# Patient Record
Sex: FEMALE | Race: WHITE | HISPANIC OR LATINO | Employment: UNEMPLOYED | URBAN - METROPOLITAN AREA
[De-identification: names, ages, dates, MRNs, and addresses within clinical notes are randomized per-mention and may not be internally consistent; named-entity substitution may affect disease eponyms.]

---

## 2017-10-10 ENCOUNTER — ALLSCRIPTS OFFICE VISIT (OUTPATIENT)
Dept: OTHER | Facility: OTHER | Age: 32
End: 2017-10-10

## 2017-10-10 DIAGNOSIS — F32.9 MAJOR DEPRESSIVE DISORDER, SINGLE EPISODE: ICD-10-CM

## 2017-10-10 DIAGNOSIS — D50.9 IRON DEFICIENCY ANEMIA: ICD-10-CM

## 2017-10-10 DIAGNOSIS — Z13.6 ENCOUNTER FOR SCREENING FOR CARDIOVASCULAR DISORDERS: ICD-10-CM

## 2017-10-11 NOTE — PROGRESS NOTES
Assessment  1  Depression (311) (F32 9)  2  Seborrheic dermatitis (690 10) (L21 9)  3  Itching (698 9) (L29 9)  4  Anemia, iron deficiency (280 9) (D50 9)  5  Obesity (278 00) (E66 9)  6  Screening for cardiovascular condition (V81 2) (Z13 6)    Plan  Anemia, iron deficiency    · (1) CBC/PLT/DIFF; Status:Active; Requested for:10Oct2017;    · (1) COMPREHENSIVE METABOLIC PANEL; Status:Active; Requested for:10Oct2017;   Depression    · Start: BuPROPion HCl ER (XL) 150 MG Oral Tablet Extended Release 24 Hour; take 1  tablet by mouth daily   · (1) TSH; Status:Active; Requested for:10Oct2017;   Obesity    · Keep a diary of when and what you eat ; Status:Complete;   Done: 65KYB0296   · Some eating tips that can help you lose weight ; Status:Complete;   Done: 72BBI0091   · We recommend that you bring your body mass index down to 26 ; Status:Complete;    Done: 19ZDX7088  Screening for cardiovascular condition    · (1) LIPID PANEL, FASTING; Status:Active; Requested for:10Oct2017;   Seborrheic dermatitis    · Start: Ketoconazole 2 % External Shampoo; shampoo twice weekly    Discussion/Summary  Discussion Summary:   Depressionpatient is going through a rough time with her marital life  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 as her  is out at work from 5 AM to 10 PM  She was previously taking bupropion  She was restarted on the same  I will see her back in 2 months  dermatitisshe was given ketoconazole shampoo to be applied twice a week  was told to cut down on the calories and increase her activity  work was ordered  Counseling Documentation With Imm: The patient was counseled regarding instructions for management,-risk factor reductions,-risks and benefits of treatment options,-importance of compliance with treatment  Medication SE Review and Pt Understands Tx: Possible side effects of new medications were reviewed with the patient/guardian today   The treatment plan was reviewed with the patient/guardian  The patient/guardian understands and agrees with the treatment plan      Chief Complaint  Chief Complaint Free Text Note Form: Establish care      History of Present Illness  Obesity (Follow-Up): The patient is being seen for follow-up of obesity  The patient reports no change in the condition  She has had no significant interval events  The patient is currently asymptomatic  The patient is not currently on medication for this problem  Iron Deficiency Anemia (Follow-Up): The patient is being seen for follow-up of iron deficiency anemia  The patient reports no change in the condition  She has had no significant interval events  The patient is currently asymptomatic  Medications:  the patient is adherent to her medication regimen, but-she denies medication side effects  Depression (Follow-Up): The patient states her depression has worsened since the last visit  They have had recurrent episodes of major depression  She describes this as moderate in severity  She has no comorbid illnesses  She has had no significant interval events  Interval Symptoms: worsened depression-and-worsened depressed mood  Associated symptoms include:  No associated symptoms are reported  Social Support: the patient does not have good social support  Medications: The patient is not currently on any medications for her depression  Review of Systems  Complete-Female:   Constitutional: No fever, no chills, feels well, no tiredness, no recent weight gain or weight loss  Eyes: No complaints of eye pain, no red eyes, no eyesight problems, no discharge, no dry eyes, no itching of eyes  ENT: no complaints of earache, no loss of hearing, no nose bleeds, no nasal discharge, no sore throat, no hoarseness  Cardiovascular: No complaints of slow heart rate, no fast heart rate, no chest pain, no palpitations, no leg claudication, no lower extremity edema     Respiratory: No complaints of shortness of breath, no wheezing, no cough, no SOB on exertion, no orthopnea, no PND  Gastrointestinal: No complaints of abdominal pain, no constipation, no nausea or vomiting, no diarrhea, no bloody stools  Genitourinary: No complaints of dysuria, no incontinence, no pelvic pain, no dysmenorrhea, no vaginal discharge or bleeding  Musculoskeletal: No complaints of arthralgias, no myalgias, no joint swelling or stiffness, no limb pain or swelling  Integumentary: No complaints of skin rash or lesions, no itching, no skin wounds, no breast pain or lump  Neurological: No complaints of headache, no confusion, no convulsions, no numbness, no dizziness or fainting, no tingling, no limb weakness, no difficulty walking  Psychiatric: as noted in HPI  Endocrine: No complaints of proptosis, no hot flashes, no muscle weakness, no deepening of the voice, no feelings of weakness  Hematologic/Lymphatic: No complaints of swollen glands, no swollen glands in the neck, does not bleed easily, does not bruise easily  Active Problems  1  Seasonal allergies (477 9) (J30 2)    Past Medical History  1  History of Denial (069 29) (R48 89)  Active Problems And Past Medical History Reviewed: The active problems and past medical history were reviewed and updated today  Surgical History  1  Denied: History of Recent Surgery  Surgical History Reviewed: The surgical history was reviewed and updated today  Family History  Mother   1  No pertinent family history  Father   2  No pertinent family history  Family History Reviewed: The family history was reviewed and updated today  Social History   · Never a smoker  Social History Reviewed: The social history was reviewed and updated today  The social history was reviewed and is unchanged  Current Meds  1  Aleve 220 MG Oral Capsule; Therapy: 14CFU1928 to Recorded  2  Allegra Allergy 60 MG Oral Tablet; Therapy: 02FEW7060 to Recorded  3   Fish Oil 1000 MG Oral Capsule; Therapy: 72MSQ9980 to Recorded  4  Melatonin 5 MG Oral Capsule; Therapy: 76OGP2301 to Recorded  5  Prenatal Vitamin 27-0 8 MG Oral Tablet; Therapy: 22DLU5668 to Recorded    Allergies  1  Sulfa Drugs    Vitals  Signs   Recorded: 01EOP9768 01:10PM   Heart Rate: 76  Systolic: 830  Diastolic: 70  Height: 5 ft 1 in  Weight: 177 lb 2 oz  BMI Calculated: 33 47  BSA Calculated: 1 79    Physical Exam    Constitutional   General appearance: Abnormal   obese  Head and Face   Head and face: Normal     Palpation of the face and sinuses: No sinus tenderness  Eyes   Conjunctiva and lids: No swelling, erythema or discharge  Pupils and irises: Equal, round, reactive to light  Ears, Nose, Mouth, and Throat   External inspection of ears and nose: Normal     Otoscopic examination: Tympanic membranes translucent with normal light reflex  Canals patent without erythema  Oropharynx: Normal with no erythema, edema, exudate or lesions  Neck   Neck: Supple, symmetric, trachea midline, no masses  Thyroid: Normal, no thyromegaly  Pulmonary   Respiratory effort: No increased work of breathing or signs of respiratory distress  Auscultation of lungs: Clear to auscultation  Cardiovascular   Palpation of heart: Normal PMI, no thrills  Auscultation of heart: Normal rate and rhythm, normal S1 and S2, no murmurs  Examination of extremities for edema and/or varicosities: Normal     Abdomen   Abdomen: Non-tender, no masses  Liver and spleen: No hepatomegaly or splenomegaly  Lymphatic   Palpation of lymph nodes in neck: No lymphadenopathy  Musculoskeletal   Gait and station: Normal     Skin   Skin and subcutaneous tissue: Normal without rashes or lesions  Neurologic   Cranial nerves: Cranial nerves II-XII intact  Cortical function: Normal mental status  Reflexes: 2+ and symmetric  Sensation: No sensory loss      Psychiatric   Judgment and insight: Normal     Mood and affect: Abnormal   Mood and Affect: depressed  Results/Data  Health Maintenance Flow Sheet 68RWP1659 01:07PM      Test Name Result Flag Reference   Pap 2014-PMC       PHQ-2 Adult Depression Screening 86RBG6320 01:05PM User, Urvashi     Test Name Result Flag Reference   PHQ-2 Adult Depression Score 2     Over the last two weeks, how often have you been bothered by any of the following problems? Little interest or pleasure in doing things: Several days - 1  Feeling down, depressed, or hopeless: Several days - 1   PHQ-2 Adult Depression Screening Negative         Future Appointments    Date/Time Provider Specialty Site   12/11/2017 01:00 PM SONIA Dutton   Internal [address] [place] [address]     Signatures   Electronically signed by : SONIA Falk ; Oct 10 2017  5:28PM EST                       (Author)    Electronically signed by : SONIA Falk ; Oct 10 2017  5:28PM EST                       (Author)

## 2017-10-12 ENCOUNTER — LAB CONVERSION - ENCOUNTER (OUTPATIENT)
Dept: OTHER | Facility: OTHER | Age: 32
End: 2017-10-12

## 2017-10-12 LAB
A/G RATIO (HISTORICAL): 1.6 (CALC) (ref 1–2.5)
ALBUMIN SERPL BCP-MCNC: 4.2 G/DL (ref 3.6–5.1)
ALP SERPL-CCNC: 86 U/L (ref 33–115)
ALT SERPL W P-5'-P-CCNC: 10 U/L (ref 6–29)
AST SERPL W P-5'-P-CCNC: 16 U/L (ref 10–30)
BASOPHILS # BLD AUTO: 0.5 %
BASOPHILS # BLD AUTO: 33 CELLS/UL (ref 0–200)
BILIRUB SERPL-MCNC: 0.4 MG/DL (ref 0.2–1.2)
BUN SERPL-MCNC: 14 MG/DL (ref 7–25)
BUN/CREA RATIO (HISTORICAL): NORMAL (CALC) (ref 6–22)
CALCIUM SERPL-MCNC: 9 MG/DL (ref 8.6–10.2)
CHLORIDE SERPL-SCNC: 104 MMOL/L (ref 98–110)
CHOLEST SERPL-MCNC: 139 MG/DL
CHOLEST/HDLC SERPL: 3.2 (CALC)
CO2 SERPL-SCNC: 23 MMOL/L (ref 20–31)
CREAT SERPL-MCNC: 0.79 MG/DL (ref 0.5–1.1)
DEPRECATED RDW RBC AUTO: 14.8 % (ref 11–15)
EGFR AFRICAN AMERICAN (HISTORICAL): 116 ML/MIN/1.73M2
EGFR-AMERICAN CALC (HISTORICAL): 100 ML/MIN/1.73M2
EOSINOPHIL # BLD AUTO: 130 CELLS/UL (ref 15–500)
EOSINOPHIL # BLD AUTO: 2 %
GAMMA GLOBULIN (HISTORICAL): 2.6 G/DL (CALC) (ref 1.9–3.7)
GLUCOSE (HISTORICAL): 83 MG/DL (ref 65–99)
HCT VFR BLD AUTO: 36.9 % (ref 35–45)
HDLC SERPL-MCNC: 43 MG/DL
HGB BLD-MCNC: 11.9 G/DL (ref 11.7–15.5)
LDL CHOLESTEROL (HISTORICAL): 80 MG/DL (CALC)
LYMPHOCYTES # BLD AUTO: 1619 CELLS/UL (ref 850–3900)
LYMPHOCYTES # BLD AUTO: 24.9 %
MCH RBC QN AUTO: 26.6 PG (ref 27–33)
MCHC RBC AUTO-ENTMCNC: 32.2 G/DL (ref 32–36)
MCV RBC AUTO: 82.4 FL (ref 80–100)
MONOCYTES # BLD AUTO: 319 CELLS/UL (ref 200–950)
MONOCYTES (HISTORICAL): 4.9 %
NEUTROPHILS # BLD AUTO: 4401 CELLS/UL (ref 1500–7800)
NEUTROPHILS # BLD AUTO: 67.7 %
NON-HDL-CHOL (CHOL-HDL) (HISTORICAL): 96 MG/DL (CALC)
PLATELET # BLD AUTO: 250 THOUSAND/UL (ref 140–400)
PMV BLD AUTO: 11.8 FL (ref 7.5–12.5)
POTASSIUM SERPL-SCNC: 3.8 MMOL/L (ref 3.5–5.3)
RBC # BLD AUTO: 4.48 MILLION/UL (ref 3.8–5.1)
SODIUM SERPL-SCNC: 137 MMOL/L (ref 135–146)
TOTAL PROTEIN (HISTORICAL): 6.8 G/DL (ref 6.1–8.1)
TRIGL SERPL-MCNC: 82 MG/DL
TSH SERPL DL<=0.05 MIU/L-ACNC: 3.31 MIU/L
WBC # BLD AUTO: 6.5 THOUSAND/UL (ref 3.8–10.8)

## 2017-12-11 ENCOUNTER — ALLSCRIPTS OFFICE VISIT (OUTPATIENT)
Dept: OTHER | Facility: OTHER | Age: 32
End: 2017-12-11

## 2017-12-12 NOTE — PROGRESS NOTES
Assessment    1  Depression (311) (F32 9)    Plan  Depression    · BuPROPion HCl ER (XL) 300 MG Oral Tablet Extended Release 24 Hour; Take 1 tablet daily    Discussion/Summary  Discussion Summary:   Depression-patient was started on bupropion and is feeling better but still has a lot of depression  I will increase the dose of bupropion to 300 milligrams daily  She was encouraged to find herself a job so that she can get out of the house which will help her in her depression symptoms  I will see her back in 4 months  Counseling Documentation With Imm: The patient was counseled regarding instructions for management,-- risk factor reductions,-- risks and benefits of treatment options,-- importance of compliance with treatment  Medication SE Review and Pt Understands Tx: Possible side effects of new medications were reviewed with the patient/guardian today  The treatment plan was reviewed with the patient/guardian  The patient/guardian understands and agrees with the treatment plan      Chief Complaint  Chief Complaint Free Text Note Form: Depression      History of Present Illness  Depression (Follow-Up): The patient states her depression has been stable since the last visit  They have had recurrent episodes of major depression  She describes this as moderate in severity  She has no comorbid illnesses  She has had no significant interval events  Interval Symptoms: stable depression-- and-- stable depressed mood  Medications: the patient is adherent with her medication regimen  -- She denies medication side effects  Review of Systems  Complete-Female:  Constitutional: No fever, no chills, feels well, no tiredness, no recent weight gain or weight loss  Eyes: No complaints of eye pain, no red eyes, no eyesight problems, no discharge, no dry eyes, no itching of eyes  ENT: no complaints of earache, no loss of hearing, no nose bleeds, no nasal discharge, no sore throat, no hoarseness    Cardiovascular: No complaints of slow heart rate, no fast heart rate, no chest pain, no palpitations, no leg claudication, no lower extremity edema  Respiratory: No complaints of shortness of breath, no wheezing, no cough, no SOB on exertion, no orthopnea, no PND  Gastrointestinal: No complaints of abdominal pain, no constipation, no nausea or vomiting, no diarrhea, no bloody stools  Genitourinary: No complaints of dysuria, no incontinence, no pelvic pain, no dysmenorrhea, no vaginal discharge or bleeding  Musculoskeletal: No complaints of arthralgias, no myalgias, no joint swelling or stiffness, no limb pain or swelling  Integumentary: No complaints of skin rash or lesions, no itching, no skin wounds, no breast pain or lump  Neurological: No complaints of headache, no confusion, no convulsions, no numbness, no dizziness or fainting, no tingling, no limb weakness, no difficulty walking  Psychiatric: as noted in HPI  Endocrine: No complaints of proptosis, no hot flashes, no muscle weakness, no deepening of the voice, no feelings of weakness  Hematologic/Lymphatic: No complaints of swollen glands, no swollen glands in the neck, does not bleed easily, does not bruise easily  Active Problems  1  Anemia, iron deficiency (280 9) (D50 9)   2  Depression (311) (F32 9)   3  Obesity (278 00) (E66 9)   4  Screening for cardiovascular condition (V81 2) (Z13 6)   5  Seasonal allergies (477 9) (J30 2)   6  Seborrheic dermatitis (690 10) (L21 9)    Past Medical History  1  History of Denial (799 29) (R45 89)   2  History of itching (V13 3) (H39 544)  Active Problems And Past Medical History Reviewed: The active problems and past medical history were reviewed and updated today  Surgical History  1  Denied: History of Recent Surgery    Family History  Mother    1  No pertinent family history  Father    2  No pertinent family history    Social History     · Never a smoker  Social History Reviewed:  The social history was reviewed and updated today  The social history was reviewed and is unchanged  Current Meds   1  Aleve 220 MG Oral Capsule; Therapy: 72RPT5550 to Recorded   2  Allegra Allergy 60 MG Oral Tablet; Therapy: 87VAL5247 to Recorded   3  BuPROPion HCl ER (XL) 150 MG Oral Tablet Extended Release 24 Hour; take 1 tablet by mouth daily; Therapy: 27KHT8911 to (Last Rx:10Oct2017)  Requested for: 37XVW8124 Ordered   4  Fish Oil 1000 MG Oral Capsule; Therapy: 99FQI7886 to Recorded   5  Ketoconazole 2 % External Shampoo; shampoo twice weekly; Therapy: 70DSY8578 to (Last Rx:10Oct2017)  Requested for: 00TXX5405 Ordered   6  Melatonin 5 MG Oral Capsule; Therapy: 85MDV8820 to Recorded   7  Prenatal Vitamin 27-0 8 MG Oral Tablet; Therapy: 38TFY1417 to Recorded  Medication List Reviewed: The medication list was reviewed and updated today  Allergies  1  Sulfa Drugs    Vitals  Vital Signs    Recorded: 64Uxd7499 12:54PM   Heart Rate 83   Systolic 446   Diastolic 68   Height 5 ft 1 in   Weight 163 lb 4 oz   BMI Calculated 30 85   BSA Calculated 1 73   O2 Saturation 98       Physical Exam   Constitutional  General appearance: Abnormal   appears tired  Eyes  Conjunctiva and lids: No swelling, erythema or discharge  Pupils and irises: Equal, round and reactive to light  Ears, Nose, Mouth, and Throat  External inspection of ears and nose: Normal    Oropharynx: Normal with no erythema, edema, exudate or lesions  Pulmonary  Respiratory effort: No increased work of breathing or signs of respiratory distress  Auscultation of lungs: Clear to auscultation  Cardiovascular  Palpation of heart: Normal PMI, no thrills  Auscultation of heart: Normal rate and rhythm, normal S1 and S2, without murmurs  Musculoskeletal  Gait and station: Normal    Skin  Skin and subcutaneous tissue: Normal without rashes or lesions  Neurologic  Cranial nerves: Cranial nerves 2-12 intact     Psychiatric  Orientation to person, place, and time: Normal    Mood and affect: Abnormal   Mood and Affect: depressed  Future Appointments    Date/Time Provider Specialty Site   04/05/2018 11:00 AM SONIA Romero   Internal Medicine [facility]       Signatures   Electronically signed by : SONIA Marquez ; Dec 11 2017  2:08PM EST                       (Author)

## 2018-01-13 VITALS
HEART RATE: 76 BPM | HEIGHT: 61 IN | SYSTOLIC BLOOD PRESSURE: 102 MMHG | DIASTOLIC BLOOD PRESSURE: 70 MMHG | WEIGHT: 177.13 LBS | BODY MASS INDEX: 33.44 KG/M2

## 2018-01-22 VITALS
DIASTOLIC BLOOD PRESSURE: 68 MMHG | OXYGEN SATURATION: 98 % | HEIGHT: 61 IN | HEART RATE: 83 BPM | BODY MASS INDEX: 30.82 KG/M2 | WEIGHT: 163.25 LBS | SYSTOLIC BLOOD PRESSURE: 106 MMHG

## 2018-01-24 ENCOUNTER — TELEPHONE (OUTPATIENT)
Dept: INTERNAL MEDICINE CLINIC | Facility: CLINIC | Age: 33
End: 2018-01-24

## 2018-01-24 DIAGNOSIS — F32.A DEPRESSION, UNSPECIFIED DEPRESSION TYPE: Primary | ICD-10-CM

## 2018-01-25 RX ORDER — ESCITALOPRAM OXALATE 10 MG/1
10 TABLET ORAL DAILY
Qty: 30 TABLET | Refills: 3 | Status: SHIPPED | OUTPATIENT
Start: 2018-01-25 | End: 2018-04-05

## 2018-04-04 RX ORDER — PNV NO.95/FERROUS FUM/FOLIC AC 28MG-0.8MG
TABLET ORAL
COMMUNITY
Start: 2017-10-10 | End: 2018-10-12

## 2018-04-04 RX ORDER — BUPROPION HYDROCHLORIDE 300 MG/1
1 TABLET ORAL DAILY
COMMUNITY
Start: 2017-10-10 | End: 2018-10-12

## 2018-04-04 RX ORDER — CHLORAL HYDRATE 500 MG
CAPSULE ORAL
COMMUNITY
Start: 2017-10-10 | End: 2018-10-12

## 2018-04-04 RX ORDER — COVID-19 ANTIGEN TEST
KIT MISCELLANEOUS
COMMUNITY
Start: 2017-10-10 | End: 2019-10-22

## 2018-04-04 RX ORDER — KETOCONAZOLE 20 MG/ML
SHAMPOO TOPICAL
COMMUNITY
Start: 2017-10-10 | End: 2018-10-12

## 2018-04-04 RX ORDER — FEXOFENADINE HYDROCHLORIDE 60 MG/1
TABLET, FILM COATED ORAL
COMMUNITY
Start: 2017-10-10

## 2018-04-05 ENCOUNTER — OFFICE VISIT (OUTPATIENT)
Dept: INTERNAL MEDICINE CLINIC | Facility: CLINIC | Age: 33
End: 2018-04-05
Payer: COMMERCIAL

## 2018-04-05 VITALS
DIASTOLIC BLOOD PRESSURE: 70 MMHG | OXYGEN SATURATION: 99 % | HEART RATE: 94 BPM | WEIGHT: 161.6 LBS | RESPIRATION RATE: 16 BRPM | HEIGHT: 62 IN | BODY MASS INDEX: 29.74 KG/M2 | SYSTOLIC BLOOD PRESSURE: 110 MMHG

## 2018-04-05 DIAGNOSIS — F33.41 RECURRENT MAJOR DEPRESSIVE DISORDER, IN PARTIAL REMISSION (HCC): Primary | ICD-10-CM

## 2018-04-05 DIAGNOSIS — Z13.6 SCREENING FOR CARDIOVASCULAR CONDITION: ICD-10-CM

## 2018-04-05 DIAGNOSIS — E66.9 OBESITY (BMI 30-39.9): ICD-10-CM

## 2018-04-05 DIAGNOSIS — J30.1 CHRONIC SEASONAL ALLERGIC RHINITIS DUE TO POLLEN: ICD-10-CM

## 2018-04-05 DIAGNOSIS — D50.9 IRON DEFICIENCY ANEMIA, UNSPECIFIED IRON DEFICIENCY ANEMIA TYPE: ICD-10-CM

## 2018-04-05 PROBLEM — F32.A DEPRESSION: Status: ACTIVE | Noted: 2017-10-10

## 2018-04-05 PROBLEM — L21.9 SEBORRHEIC DERMATITIS: Status: ACTIVE | Noted: 2017-10-10

## 2018-04-05 PROBLEM — J30.2 SEASONAL ALLERGIES: Status: ACTIVE | Noted: 2017-10-10

## 2018-04-05 PROCEDURE — 99214 OFFICE O/P EST MOD 30 MIN: CPT | Performed by: INTERNAL MEDICINE

## 2018-04-05 PROCEDURE — 3008F BODY MASS INDEX DOCD: CPT | Performed by: INTERNAL MEDICINE

## 2018-04-05 RX ORDER — ESCITALOPRAM OXALATE 10 MG/1
TABLET ORAL
Refills: 3 | COMMUNITY
Start: 2018-03-23 | End: 2018-04-05 | Stop reason: SDUPTHER

## 2018-04-05 RX ORDER — ESCITALOPRAM OXALATE 10 MG/1
10 TABLET ORAL DAILY
Qty: 90 TABLET | Refills: 1 | Status: SHIPPED | OUTPATIENT
Start: 2018-04-05 | End: 2018-10-12 | Stop reason: SDUPTHER

## 2018-04-05 NOTE — PROGRESS NOTES
INTERNAL MEDICINE OFFICE VISIT  [facility]  [facility] [address] [place], [address] [place] [address]  Tel: [phone]      NAME: Julia Rivers  AGE: 33 y o  SEX: female  : 1985   MRN: [id number]    DATE: 2018  TIME: 11:57 AM      Assessment and Plan:  1  Recurrent major depressive disorder, in partial remission ([facility] [address] )  Her symptoms are much better controlled with the Lexapro and Wellbutrin together  She is tolerating medications very well  - escitalopram (LEXAPRO) 10 mg tablet; Take 1 tablet (10 mg total) by mouth daily  Dispense: 90 tablet; Refill: 1  - TSH, 3rd generation; Future    2  Iron deficiency anemia, unspecified iron deficiency anemia type  The last hemoglobin was 11 9  I will follow up the CBC     - CBC and differential; Future  - Comprehensive metabolic panel; Future  - CBC and differential; Future  - Comprehensive metabolic panel; Future    3  Obesity (BMI 30-39  9)  She was told to cut down the calories and increase her activity    4  Chronic seasonal allergic rhinitis due to pollen  She is stable with the allergy medication as needed    5  Screening for cardiovascular condition    - Lipid panel; Future      - Counseling Documentation: patient was counseled regarding: diagnostic results, instructions for management, risk factor reductions, prognosis, patient and family education, impressions, risks and benefits of treatment options and importance of compliance with treatment  - Medication Side Effects: Adverse side effects of medications were reviewed with the patient/guardian today  Return for follow up visit in 6 months or earlier, if needed  Chief Complaint:  Chief Complaint   Patient presents with    Well Check     Follow up         History of Present Illness:   Depression-the symptoms are much better controlled now on present medications    Iron deficiency anemia-she continues to have heavy periods and move I will follow up with the blood work  Obesity-she will try to lose some weight  Allergic rhinitis-stable on medication as needed  Active Problem List:  Patient Active Problem List   Diagnosis    Anemia, iron deficiency    Recurrent major depressive disorder, in partial remission (HCC)    Seasonal allergies    Seborrheic dermatitis    Obesity (BMI 30-39  9)         Past Medical History:  History reviewed  No pertinent past medical history  Past Surgical History:  Past Surgical History:   Procedure Laterality Date    NO PAST SURGERIES           Family History:  Family History   Problem Relation Age of Onset    No Known Problems Mother     No Known Problems Father          Social History:  Social History     Social History    Marital status: /Civil Union     Spouse name: N/A    Number of children: N/A    Years of education: N/A     Social History Main Topics    Smoking status: Never Smoker    Smokeless tobacco: Never Used    Alcohol use No    Drug use: No    Sexual activity: Yes     Partners: Male     Other Topics Concern    None     Social History Narrative    None         Allergies:   Allergies   Allergen Reactions    Sulfamethoxazole-Trimethoprim          Medications:    Current Outpatient Prescriptions:     buPROPion (WELLBUTRIN XL) 300 mg 24 hr tablet, Take 1 tablet by mouth daily, Disp: , Rfl:     escitalopram (LEXAPRO) 10 mg tablet, Take 1 tablet (10 mg total) by mouth daily, Disp: 90 tablet, Rfl: 1    fexofenadine (ALLEGRA) 60 MG tablet, Take by mouth, Disp: , Rfl:     Melatonin 5 MG CAPS, Take by mouth, Disp: , Rfl:     Naproxen Sodium (ALEVE) 220 MG CAPS, Take by mouth, Disp: , Rfl:     Omega-3 Fatty Acids (FISH OIL) 1,000 mg, Take by mouth, Disp: , Rfl:     Prenatal Vit-Fe Fumarate-FA (PRENATAL VITAMIN) 27-0 8 MG TABS, Take by mouth, Disp: , Rfl:     ketoconazole (NIZORAL) 2 % shampoo, Apply topically, Disp: , Rfl:       The following portions of the patient's history were reviewed and updated as appropriate: past medical history, past surgical history, family history, social history, allergies, current medications and active problem list       Review of Systems:  Constitutional: Denies fever, chills, weight gain, weight loss, fatigue  Eyes: Denies eye redness, eye discharge, double vision, change in visual acuity  ENT: Denies hearing loss, tinnitus, sneezing, nasal congestion, nasal discharge, sore throat   Respiratory: Denies cough, expectoration, hemoptysis, shortness of breath, wheezing  Cardiovascular: Denies chest pain, palpitations, lower extremity swelling, orthopnea, PND  Gastrointestinal: Denies abdominal pain, heartburn, nausea, vomiting, hematemesis, diarrhea, bloody stools  Genito-Urinary: Denies dysuria, frequency, difficulty in micturition, nocturia, incontinence  Musculoskeletal: Denies back pain, joint pain, muscle pain  Neurologic: Denies confusion, lightheadedness, syncope, headache, focal weakness, sensory changes, seizures  Endocrine: Denies polyuria, polydipsia, temperature intolerance  Allergy and Immunology: Denies hives, insect bite sensitivity  Hematological and Lymphatic: Denies bleeding problems, swollen glands   Psychological: Denies depression, suicidal ideation, anxiety, panic, mood swings  Dermatological: Denies pruritus, rash, skin lesion changes      Vitals:  Vitals:    04/05/18 1141   BP: 110/70   Pulse: 94   Resp: 16   SpO2: 99%       Body mass index is 30 04 kg/m²  Weight (last 2 days)     Date/Time   Weight    04/05/18 1141  73 3 (161 6)                Physical Examination:  General: Patient is not in acute distress  Awake, alert, responding to commands  No weight gain or loss  Head: Normocephalic  Atraumatic  Eyes: Conjunctiva and lids with no swelling, erythema or discharge  Both pupils normal sized, round and reactive to light   Sclera nonicteric  ENT: External examination of nose and ear normal  Otoscopic examination shows translucent tympanic membranes with patent canals without erythema  Oropharynx moist with no erythema, edema, exudate or lesions  Neck: Supple  JVP not raised  Trachea midline  No masses  No thyromegaly  Lungs: No signs of increased work of breathing or respiratory distress  Bilateral bronchovascular breath sounds with no crackles or rhonchi  Chest wall: No tenderness  Cardiovascular: Normal PMI  No thrills  Regular rate and rhythm  S1 and S2 normal  No murmur, rub or gallop  Gastrointestinal: Abdomen soft, nontender  No guarding or rigidity  Liver and spleen not palpable  Bowel sounds present  Neurologic: Cranial nerves II-XII intact   Cortical functions normal  Motor system - Reflexes 2+ and symmetrical  Sensations normal  Musculoskeletal: Gait normal  No joint tenderness  Integumentary: Skin normal with no rash or lesions  Lymphatic: No palpable lymph nodes in neck, axilla or groin  Extremities: No clubbing, cyanosis, edema or varicosities  Psychological: Judgement and insight normal  Mood and affect normal      Laboratory Results:  CBC with diff:   Lab Results   Component Value Date    WBC 6 5 10/11/2017    RBC 4 48 10/11/2017    HGB 11 9 10/11/2017    HCT 36 9 10/11/2017    MCV 82 4 10/11/2017    MCH 26 6 (L) 10/11/2017    RDW 14 8 10/11/2017     10/11/2017       CMP:  Lab Results   Component Value Date    CREATININE 0 79 10/11/2017    BUN 14 10/11/2017     10/11/2017    K 3 8 10/11/2017     10/11/2017    CO2 23 10/11/2017    PROT 6 8 10/11/2017    ALKPHOS 86 10/11/2017    ALT 10 10/11/2017    AST 16 10/11/2017       No results found for: HGBA1C, MG, PHOS    No results found for: TROPONINI, CKMB, CKTOTAL    Lipid Profile:   Lab Results   Component Value Date    CHOL 139 10/11/2017     Lab Results   Component Value Date    HDL 43 (L) 10/11/2017     No results found for: [facility]  Lab Results   Component Value Date    TRIG 82 10/11/2017         Health Maintenance:  Health Maintenance   Topic Date Due    HIV SCREENING 1985    DTaP,Tdap,and Td Vaccines (1 - Tdap) 06/16/2007    INFLUENZA VACCINE  10/05/2018 (Originally 9/1/2017)    Depression Screening PHQ-9  04/05/2019       There is no immunization history on file for this patient        Tenzin Gibson MD  4/5/2018,11:57 AM

## 2018-10-06 LAB
ALBUMIN SERPL-MCNC: 3.9 G/DL (ref 3.6–5.1)
ALBUMIN/GLOB SERPL: 1.4 (CALC) (ref 1–2.5)
ALP SERPL-CCNC: 69 U/L (ref 33–115)
ALT SERPL-CCNC: 23 U/L (ref 6–29)
AST SERPL-CCNC: 23 U/L (ref 10–30)
BASOPHILS # BLD AUTO: 48 CELLS/UL (ref 0–200)
BASOPHILS NFR BLD AUTO: 0.8 %
BILIRUB SERPL-MCNC: 0.3 MG/DL (ref 0.2–1.2)
BUN SERPL-MCNC: 18 MG/DL (ref 7–25)
BUN/CREAT SERPL: NORMAL (CALC) (ref 6–22)
CALCIUM SERPL-MCNC: 8.8 MG/DL (ref 8.6–10.2)
CHLORIDE SERPL-SCNC: 108 MMOL/L (ref 98–110)
CHOLEST SERPL-MCNC: 166 MG/DL
CHOLEST/HDLC SERPL: 2.4 (CALC)
CO2 SERPL-SCNC: 26 MMOL/L (ref 20–32)
CREAT SERPL-MCNC: 0.77 MG/DL (ref 0.5–1.1)
EOSINOPHIL # BLD AUTO: 210 CELLS/UL (ref 15–500)
EOSINOPHIL NFR BLD AUTO: 3.5 %
ERYTHROCYTE [DISTWIDTH] IN BLOOD BY AUTOMATED COUNT: 13.3 % (ref 11–15)
GLOBULIN SER CALC-MCNC: 2.7 G/DL (CALC) (ref 1.9–3.7)
GLUCOSE SERPL-MCNC: 77 MG/DL (ref 65–99)
HCT VFR BLD AUTO: 36.8 % (ref 35–45)
HDLC SERPL-MCNC: 68 MG/DL
HGB BLD-MCNC: 11.5 G/DL (ref 11.7–15.5)
LDLC SERPL CALC-MCNC: 85 MG/DL (CALC)
LYMPHOCYTES # BLD AUTO: 1662 CELLS/UL (ref 850–3900)
LYMPHOCYTES NFR BLD AUTO: 27.7 %
MCH RBC QN AUTO: 26.2 PG (ref 27–33)
MCHC RBC AUTO-ENTMCNC: 31.3 G/DL (ref 32–36)
MCV RBC AUTO: 83.8 FL (ref 80–100)
MONOCYTES # BLD AUTO: 432 CELLS/UL (ref 200–950)
MONOCYTES NFR BLD AUTO: 7.2 %
NEUTROPHILS # BLD AUTO: 3648 CELLS/UL (ref 1500–7800)
NEUTROPHILS NFR BLD AUTO: 60.8 %
NONHDLC SERPL-MCNC: 98 MG/DL (CALC)
PLATELET # BLD AUTO: 265 THOUSAND/UL (ref 140–400)
PMV BLD REES-ECKER: 11.6 FL (ref 7.5–12.5)
POTASSIUM SERPL-SCNC: 5.1 MMOL/L (ref 3.5–5.3)
PROT SERPL-MCNC: 6.6 G/DL (ref 6.1–8.1)
RBC # BLD AUTO: 4.39 MILLION/UL (ref 3.8–5.1)
SL AMB EGFR AFRICAN AMERICAN: 118 ML/MIN/1.73M2
SL AMB EGFR NON AFRICAN AMERICAN: 102 ML/MIN/1.73M2
SODIUM SERPL-SCNC: 140 MMOL/L (ref 135–146)
TRIGL SERPL-MCNC: 51 MG/DL
TSH SERPL-ACNC: 4.01 MIU/L
WBC # BLD AUTO: 6 THOUSAND/UL (ref 3.8–10.8)

## 2018-10-10 ENCOUNTER — TELEPHONE (OUTPATIENT)
Dept: INTERNAL MEDICINE CLINIC | Facility: CLINIC | Age: 33
End: 2018-10-10

## 2018-10-12 ENCOUNTER — OFFICE VISIT (OUTPATIENT)
Dept: INTERNAL MEDICINE CLINIC | Facility: CLINIC | Age: 33
End: 2018-10-12
Payer: COMMERCIAL

## 2018-10-12 VITALS
SYSTOLIC BLOOD PRESSURE: 116 MMHG | OXYGEN SATURATION: 99 % | HEIGHT: 62 IN | DIASTOLIC BLOOD PRESSURE: 72 MMHG | BODY MASS INDEX: 32.17 KG/M2 | HEART RATE: 63 BPM | WEIGHT: 174.8 LBS

## 2018-10-12 DIAGNOSIS — D50.9 IRON DEFICIENCY ANEMIA, UNSPECIFIED IRON DEFICIENCY ANEMIA TYPE: ICD-10-CM

## 2018-10-12 DIAGNOSIS — J30.2 SEASONAL ALLERGIES: ICD-10-CM

## 2018-10-12 DIAGNOSIS — E66.9 OBESITY (BMI 30-39.9): ICD-10-CM

## 2018-10-12 DIAGNOSIS — F33.41 RECURRENT MAJOR DEPRESSIVE DISORDER, IN PARTIAL REMISSION (HCC): Primary | ICD-10-CM

## 2018-10-12 PROCEDURE — 99214 OFFICE O/P EST MOD 30 MIN: CPT | Performed by: INTERNAL MEDICINE

## 2018-10-12 RX ORDER — METRONIDAZOLE 7.5 MG/G
GEL VAGINAL
Refills: 0 | COMMUNITY
Start: 2018-10-07 | End: 2019-04-16

## 2018-10-12 RX ORDER — ESCITALOPRAM OXALATE 10 MG/1
10 TABLET ORAL DAILY
Qty: 90 TABLET | Refills: 1 | Status: SHIPPED | OUTPATIENT
Start: 2018-10-12 | End: 2019-03-29 | Stop reason: SDUPTHER

## 2018-10-12 NOTE — PROGRESS NOTES
INTERNAL MEDICINE OFFICE VISIT  [facility] of BEHAVIORAL MEDICINE AT [place] [address], [address]  Tel: [phone]      NAME: Fredna Severs  AGE: 29 y o  SEX: female  : 1985   MRN: [id number]    DATE: 10/12/2018  TIME: 11:31 AM      Assessment and Plan:  1  Recurrent major depressive disorder, in partial remission ([facility] [address] )  She was told to continue the Lexapro at the same dose to take it regularly every day  - escitalopram (LEXAPRO) 10 mg tablet; Take 1 tablet (10 mg total) by mouth daily  Dispense: 90 tablet; Refill: 1    2  Iron deficiency anemia, unspecified iron deficiency anemia type  Follow-up CBC    3  Seasonal allergies  Continue Allegra as needed    4  Obesity (BMI 30-39  9)  She was told to lose weight      - Counseling Documentation: patient was counseled regarding: diagnostic results, instructions for management, risk factor reductions, prognosis, patient and family education, impressions, risks and benefits of treatment options and importance of compliance with treatment  - Medication Side Effects: Adverse side effects of medications were reviewed with the patient/guardian today  Return for follow up visit in 6 months or earlier, if needed  Chief Complaint:  Chief Complaint   Patient presents with    Well Check     6 months         History of Present Illness:   Depression-patient has been very depressed recently and also has anxiety issues  She has been taking the Wellbutrin and the Lexapro in the past and then she decided to stop the Wellbutrin and is taking the Lexapro also inconsistently  Iron deficiency anemia-she has heavy periods and her hemoglobin was 11 5  Seasonal allergies-she takes Allegra as needed  Obesity-she is trying to lose weight        Active Problem List:  Patient Active Problem List   Diagnosis    Anemia, iron deficiency    Recurrent major depressive disorder, in partial remission (HCC)    Seasonal allergies    Seborrheic dermatitis    Obesity (BMI 30-39  9)         Past Medical History:  History reviewed  No pertinent past medical history  Past Surgical History:  Past Surgical History:   Procedure Laterality Date    NO PAST SURGERIES           Family History:  Family History   Problem Relation Age of Onset    No Known Problems Mother     No Known Problems Father          Social History:  Social History     Social History    Marital status: /Civil Union     Spouse name: N/A    Number of children: N/A    Years of education: N/A     Social History Main Topics    Smoking status: Never Smoker    Smokeless tobacco: Never Used    Alcohol use No    Drug use: No    Sexual activity: Yes     Partners: Male     Other Topics Concern    None     Social History Narrative    None         Allergies:   Allergies   Allergen Reactions    Sulfamethoxazole-Trimethoprim          Medications:    Current Outpatient Prescriptions:     escitalopram (LEXAPRO) 10 mg tablet, Take 1 tablet (10 mg total) by mouth daily, Disp: 90 tablet, Rfl: 1    fexofenadine (ALLEGRA) 60 MG tablet, Take by mouth, Disp: , Rfl:     metroNIDAZOLE (METROGEL) 0 75 % vaginal gel, INSERT ONE APPLICATOR FULL INTERVAGINALLY  BEFORE BEDTIME FOR 5 NIGHTS, Disp: , Rfl: 0    Naproxen Sodium (ALEVE) 220 MG CAPS, Take by mouth, Disp: , Rfl:       The following portions of the patient's history were reviewed and updated as appropriate: past medical history, past surgical history, family history, social history, allergies, current medications and active problem list       Review of Systems:  Constitutional: Denies fever, chills, weight gain, weight loss, fatigue  Eyes: Denies eye redness, eye discharge, double vision, change in visual acuity  ENT: Denies hearing loss, tinnitus, sneezing, nasal congestion, nasal discharge, sore throat   Respiratory: Denies cough, expectoration, hemoptysis, shortness of breath, wheezing  Cardiovascular: Denies chest pain, palpitations, lower extremity swelling, orthopnea, PND  Gastrointestinal: Denies abdominal pain, heartburn, nausea, vomiting, hematemesis, diarrhea, bloody stools  Genito-Urinary: Denies dysuria, frequency, difficulty in micturition, nocturia, incontinence  Musculoskeletal: Denies back pain, joint pain, muscle pain  Neurologic: Denies confusion, lightheadedness, syncope, headache, focal weakness, sensory changes, seizures  Endocrine: Denies polyuria, polydipsia, temperature intolerance  Allergy and Immunology: Denies hives, insect bite sensitivity  Hematological and Lymphatic: Denies bleeding problems, swollen glands   Psychological:  has depression, anxiety, panic, mood swings  Dermatological: Denies pruritus, rash, skin lesion changes      Vitals:  Vitals:    10/12/18 1105   BP: 116/72   Pulse: 63   SpO2: 99%       Body mass index is 32 49 kg/m²  Weight (last 2 days)     Date/Time   Weight    10/12/18 1105  79 3 (174 8)                Physical Examination:  General: Patient is not in acute distress  Awake, alert, responding to commands  No weight gain or loss  Head: Normocephalic  Atraumatic  Eyes: Conjunctiva and lids with no swelling, erythema or discharge  Both pupils normal sized, round and reactive to light  Sclera nonicteric  ENT: External examination of nose and ear normal  Otoscopic examination shows translucent tympanic membranes with patent canals without erythema  Oropharynx moist with no erythema, edema, exudate or lesions  Neck: Supple  JVP not raised  Trachea midline  No masses  No thyromegaly  Lungs: No signs of increased work of breathing or respiratory distress  Bilateral bronchovascular breath sounds with no crackles or rhonchi  Chest wall: No tenderness  Cardiovascular: Normal PMI  No thrills  Regular rate and rhythm  S1 and S2 normal  No murmur, rub or gallop  Gastrointestinal: Abdomen soft, nontender  No guarding or rigidity  Liver and spleen not palpable   Bowel sounds present  Neurologic: Cranial nerves II-XII intact  Cortical functions normal  Motor system - Reflexes 2+ and symmetrical  Sensations normal  Musculoskeletal: Gait normal  No joint tenderness  Integumentary: Skin normal with no rash or lesions  Lymphatic: No palpable lymph nodes in neck, axilla or groin  Extremities: No clubbing, cyanosis, edema or varicosities  Psychological: Judgement and insight normal  Depression and anxiety      Laboratory Results:  CBC with diff:   Lab Results   Component Value Date    WBC 6 0 10/05/2018    WBC 6 5 10/11/2017    RBC 4 39 10/05/2018    RBC 4 48 10/11/2017    HGB 11 5 (L) 10/05/2018    HGB 11 9 10/11/2017    HCT 36 8 10/05/2018    HCT 36 9 10/11/2017    MCV 83 8 10/05/2018    MCV 82 4 10/11/2017    MCH 26 2 (L) 10/05/2018    MCH 26 6 (L) 10/11/2017    RDW 13 3 10/05/2018    RDW 14 8 10/11/2017     10/05/2018     10/11/2017       CMP:  Lab Results   Component Value Date    CREATININE 0 77 10/05/2018    CREATININE 0 79 10/11/2017    BUN 18 10/05/2018     10/11/2017    K 3 8 10/11/2017     10/05/2018    CO2 26 10/05/2018    PROT 6 8 10/11/2017    ALKPHOS 69 10/05/2018    ALT 10 10/11/2017    AST 16 10/11/2017       No results found for: HGBA1C, MG, PHOS    No results found for: TROPONINI, CKMB, CKTOTAL    Lipid Profile:   Lab Results   Component Value Date    CHOL 139 10/11/2017     Lab Results   Component Value Date    HDL 68 10/05/2018    HDL 43 (L) 10/11/2017     No results found for: [facility]  Lab Results   Component Value Date    TRIG 51 10/05/2018    TRIG 82 10/11/2017         Health Maintenance:  Health Maintenance   Topic Date Due    DTaP,Tdap,and Td Vaccines (1 - Tdap) 06/16/2007    PAP SMEAR  06/16/2007    INFLUENZA VACCINE  07/01/2018       There is no immunization history on file for this patient        Lawyer Joao MD  10/12/2018,11:31 AM

## 2019-03-29 DIAGNOSIS — F33.41 RECURRENT MAJOR DEPRESSIVE DISORDER, IN PARTIAL REMISSION (HCC): ICD-10-CM

## 2019-03-29 RX ORDER — ESCITALOPRAM OXALATE 10 MG/1
TABLET ORAL
Qty: 90 TABLET | Refills: 0 | Status: SHIPPED | OUTPATIENT
Start: 2019-03-29 | End: 2019-04-16 | Stop reason: SDUPTHER

## 2019-04-16 ENCOUNTER — OFFICE VISIT (OUTPATIENT)
Dept: INTERNAL MEDICINE CLINIC | Facility: CLINIC | Age: 34
End: 2019-04-16
Payer: COMMERCIAL

## 2019-04-16 VITALS
HEIGHT: 62 IN | BODY MASS INDEX: 34.37 KG/M2 | DIASTOLIC BLOOD PRESSURE: 66 MMHG | SYSTOLIC BLOOD PRESSURE: 108 MMHG | OXYGEN SATURATION: 98 % | WEIGHT: 186.8 LBS | HEART RATE: 88 BPM

## 2019-04-16 DIAGNOSIS — J30.2 SEASONAL ALLERGIES: ICD-10-CM

## 2019-04-16 DIAGNOSIS — D50.9 IRON DEFICIENCY ANEMIA, UNSPECIFIED IRON DEFICIENCY ANEMIA TYPE: ICD-10-CM

## 2019-04-16 DIAGNOSIS — E66.9 OBESITY (BMI 30-39.9): ICD-10-CM

## 2019-04-16 DIAGNOSIS — R42 VERTIGO: ICD-10-CM

## 2019-04-16 DIAGNOSIS — F33.41 RECURRENT MAJOR DEPRESSIVE DISORDER, IN PARTIAL REMISSION (HCC): Primary | ICD-10-CM

## 2019-04-16 PROCEDURE — 99214 OFFICE O/P EST MOD 30 MIN: CPT | Performed by: INTERNAL MEDICINE

## 2019-04-16 PROCEDURE — 3008F BODY MASS INDEX DOCD: CPT | Performed by: INTERNAL MEDICINE

## 2019-04-16 RX ORDER — MECLIZINE HYDROCHLORIDE 25 MG/1
25 TABLET ORAL 3 TIMES DAILY PRN
Qty: 30 TABLET | Refills: 0 | Status: SHIPPED | OUTPATIENT
Start: 2019-04-16 | End: 2019-10-22

## 2019-04-16 RX ORDER — ESCITALOPRAM OXALATE 20 MG/1
20 TABLET ORAL DAILY
Qty: 90 TABLET | Refills: 1 | Status: SHIPPED | OUTPATIENT
Start: 2019-04-16 | End: 2019-10-05 | Stop reason: SDUPTHER

## 2019-08-06 ENCOUNTER — OFFICE VISIT (OUTPATIENT)
Dept: INTERNAL MEDICINE CLINIC | Facility: CLINIC | Age: 34
End: 2019-08-06
Payer: COMMERCIAL

## 2019-08-06 VITALS
OXYGEN SATURATION: 99 % | DIASTOLIC BLOOD PRESSURE: 72 MMHG | BODY MASS INDEX: 36.29 KG/M2 | TEMPERATURE: 98.1 F | HEART RATE: 68 BPM | SYSTOLIC BLOOD PRESSURE: 116 MMHG | WEIGHT: 197.2 LBS | HEIGHT: 62 IN

## 2019-08-06 DIAGNOSIS — M54.2 CERVICALGIA: Primary | ICD-10-CM

## 2019-08-06 PROCEDURE — 3008F BODY MASS INDEX DOCD: CPT | Performed by: INTERNAL MEDICINE

## 2019-08-06 PROCEDURE — 99213 OFFICE O/P EST LOW 20 MIN: CPT | Performed by: INTERNAL MEDICINE

## 2019-08-06 RX ORDER — METHOCARBAMOL 500 MG/1
500 TABLET, FILM COATED ORAL 2 TIMES DAILY
Qty: 20 TABLET | Refills: 0 | Status: SHIPPED | OUTPATIENT
Start: 2019-08-06 | End: 2019-10-22

## 2019-08-06 NOTE — PROGRESS NOTES
INTERNAL MEDICINE FOLLOW-UP OFFICE VISIT  [facility] of BEHAVIORAL MEDICINE AT [facility]    NAME: Roxanne Dyer  AGE: 36 y o  SEX: female  : 1985   MRN: [id number]    DATE: 2019  TIME: 8:40 AM    Assessment and Plan     Diagnoses and all orders for this visit:    Cervicalgia  -     methocarbamol (ROBAXIN) 500 mg tablet; Take 1 tablet (500 mg total) by mouth 2 (two) times a day    She will continue taking the naproxen twice a day and apply heat to the neck  If she does not get better, she will need to go to physical therapy  - Counseling Documentation: patient was counseled regarding: instructions for management, risk factor reductions, prognosis, patient and family education, impressions, risks and benefits of treatment options and importance of compliance with treatment  - Medication Side Effects: Adverse side effects of medications were reviewed with the patient/guardian today  Return to office in: as needed    Chief Complaint     Chief Complaint   Patient presents with    Pain     Head, Neck and Right Shoulder       History of Present Illness     Neck Pain    This is a new problem  The current episode started 1 to 4 weeks ago  The problem occurs daily  The problem has been unchanged  The pain is associated with a sleep position  The pain is present in the right side  The pain is at a severity of 4/10  The pain is moderate  The symptoms are aggravated by position  Pertinent negatives include no chest pain, fever, headaches, numbness or weakness  She has tried NSAIDs for the symptoms  The treatment provided mild relief  The following portions of the patient's history were reviewed and updated as appropriate: allergies, current medications, past family history, past medical history, past social history, past surgical history and problem list     Review of Systems     Review of Systems   Constitutional: Negative for chills, diaphoresis, fatigue and fever     HENT: Negative for congestion, ear discharge, ear pain, hearing loss, postnasal drip, rhinorrhea, sinus pressure, sinus pain, sneezing, sore throat and voice change  Eyes: Negative for pain, discharge, redness and visual disturbance  Respiratory: Negative for cough, chest tightness, shortness of breath and wheezing  Cardiovascular: Negative for chest pain, palpitations and leg swelling  Gastrointestinal: Negative for abdominal distention, abdominal pain, blood in stool, constipation, diarrhea, nausea and vomiting  Endocrine: Negative for cold intolerance, heat intolerance, polydipsia, polyphagia and polyuria  Genitourinary: Negative for dysuria, flank pain, frequency, hematuria and urgency  Musculoskeletal: Positive for neck pain  Negative for arthralgias, back pain, gait problem, joint swelling, myalgias and neck stiffness  Skin: Negative for rash  Neurological: Negative for dizziness, tremors, syncope, facial asymmetry, speech difficulty, weakness, light-headedness, numbness and headaches  Hematological: Does not bruise/bleed easily  Psychiatric/Behavioral: Negative for behavioral problems, confusion and sleep disturbance  The patient is not nervous/anxious  Active Problem List     Patient Active Problem List   Diagnosis    Anemia, iron deficiency    Recurrent major depressive disorder, in partial remission (HCC)    Seasonal allergies    Seborrheic dermatitis    Obesity (BMI 30-39  9)    Vertigo    Cervicalgia       Objective     /72   Pulse 68   Temp 98 1 °F (36 7 °C)   Ht 5' 1 5" (1 562 m)   Wt 89 4 kg (197 lb 3 2 oz)   SpO2 99%   BMI 36 66 kg/m²     Physical Exam   Constitutional: She is oriented to person, place, and time  She appears well-developed and well-nourished  No distress  HENT:   Head: Normocephalic and atraumatic     Right Ear: External ear normal    Left Ear: External ear normal    Nose: Nose normal    Mouth/Throat: Oropharynx is clear and moist    Eyes: Conjunctivae and EOM are normal  Right eye exhibits no discharge  Left eye exhibits no discharge  No scleral icterus  Neck: Normal range of motion  Neck supple  No JVD present  No tracheal deviation present  No thyromegaly present  Cardiovascular: Normal rate, regular rhythm, normal heart sounds and intact distal pulses  Exam reveals no gallop and no friction rub  No murmur heard  Pulmonary/Chest: Effort normal and breath sounds normal  No respiratory distress  She has no wheezes  She has no rales  She exhibits no tenderness  Abdominal: Soft  Bowel sounds are normal  She exhibits no distension  There is no tenderness  There is no rebound and no guarding  Musculoskeletal: Normal range of motion  She exhibits tenderness  She exhibits no edema  Tenderness in the right trapezius muscles  Lymphadenopathy:     She has no cervical adenopathy  Neurological: She is alert and oriented to person, place, and time  No cranial nerve deficit  She exhibits normal muscle tone  Coordination normal    Skin: Skin is warm and dry  No rash noted  She is not diaphoretic  No erythema  Psychiatric: She has a normal mood and affect   Judgment normal            Current Medications       Current Outpatient Medications:     escitalopram (LEXAPRO) 20 mg tablet, Take 1 tablet (20 mg total) by mouth daily, Disp: 90 tablet, Rfl: 1    fexofenadine (ALLEGRA) 60 MG tablet, Take by mouth, Disp: , Rfl:     Naproxen Sodium (ALEVE) 220 MG CAPS, Take by mouth, Disp: , Rfl:     meclizine (ANTIVERT) 25 mg tablet, Take 1 tablet (25 mg total) by mouth 3 (three) times a day as needed for dizziness (Patient not taking: Reported on 8/6/2019), Disp: 30 tablet, Rfl: 0    methocarbamol (ROBAXIN) 500 mg tablet, Take 1 tablet (500 mg total) by mouth 2 (two) times a day, Disp: 20 tablet, Rfl: 0    Health Maintenance     Health Maintenance   Topic Date Due    DTaP,Tdap,and Td Vaccines (1 - Tdap) 06/16/2007    INFLUENZA VACCINE  07/01/2019    BMI: Followup Plan  04/16/2020    BMI: Adult  04/16/2020    PAP SMEAR  12/07/2020    Pneumococcal Vaccine: 65+ Years (1 of 2 - PCV13) 06/16/2051    Pneumococcal Vaccine: Pediatrics (0 to 5 Years) and At-Risk Patients (6 to 59 Years)  Aged Out    HEPATITIS B VACCINES  Aged [employer] History   Administered Date(s) Administered    MMR 01/15/2015         Lyle Lau MD  [address] of BEHAVIORAL MEDICINE AT [facility]

## 2019-10-05 DIAGNOSIS — F33.41 RECURRENT MAJOR DEPRESSIVE DISORDER, IN PARTIAL REMISSION (HCC): ICD-10-CM

## 2019-10-07 RX ORDER — ESCITALOPRAM OXALATE 20 MG/1
TABLET ORAL
Qty: 90 TABLET | Refills: 0 | Status: SHIPPED | OUTPATIENT
Start: 2019-10-07 | End: 2019-10-22

## 2019-10-17 LAB
ALBUMIN SERPL-MCNC: 4 G/DL (ref 3.6–5.1)
ALBUMIN/GLOB SERPL: 1.4 (CALC) (ref 1–2.5)
ALP SERPL-CCNC: 80 U/L (ref 33–115)
ALT SERPL-CCNC: 18 U/L (ref 6–29)
AST SERPL-CCNC: 18 U/L (ref 10–30)
BASOPHILS # BLD AUTO: 42 CELLS/UL (ref 0–200)
BASOPHILS NFR BLD AUTO: 0.6 %
BILIRUB SERPL-MCNC: 0.4 MG/DL (ref 0.2–1.2)
BUN SERPL-MCNC: 13 MG/DL (ref 7–25)
BUN/CREAT SERPL: NORMAL (CALC) (ref 6–22)
CALCIUM SERPL-MCNC: 9.2 MG/DL (ref 8.6–10.2)
CHLORIDE SERPL-SCNC: 106 MMOL/L (ref 98–110)
CO2 SERPL-SCNC: 26 MMOL/L (ref 20–32)
CREAT SERPL-MCNC: 0.71 MG/DL (ref 0.5–1.1)
EOSINOPHIL # BLD AUTO: 301 CELLS/UL (ref 15–500)
EOSINOPHIL NFR BLD AUTO: 4.3 %
ERYTHROCYTE [DISTWIDTH] IN BLOOD BY AUTOMATED COUNT: 13 % (ref 11–15)
GLOBULIN SER CALC-MCNC: 2.8 G/DL (CALC) (ref 1.9–3.7)
GLUCOSE SERPL-MCNC: 87 MG/DL (ref 65–99)
HCT VFR BLD AUTO: 38.7 % (ref 35–45)
HGB BLD-MCNC: 12.4 G/DL (ref 11.7–15.5)
LYMPHOCYTES # BLD AUTO: 1596 CELLS/UL (ref 850–3900)
LYMPHOCYTES NFR BLD AUTO: 22.8 %
MCH RBC QN AUTO: 27.4 PG (ref 27–33)
MCHC RBC AUTO-ENTMCNC: 32 G/DL (ref 32–36)
MCV RBC AUTO: 85.6 FL (ref 80–100)
MONOCYTES # BLD AUTO: 462 CELLS/UL (ref 200–950)
MONOCYTES NFR BLD AUTO: 6.6 %
NEUTROPHILS # BLD AUTO: 4599 CELLS/UL (ref 1500–7800)
NEUTROPHILS NFR BLD AUTO: 65.7 %
PLATELET # BLD AUTO: 255 THOUSAND/UL (ref 140–400)
PMV BLD REES-ECKER: 11.3 FL (ref 7.5–12.5)
POTASSIUM SERPL-SCNC: 4.6 MMOL/L (ref 3.5–5.3)
PROT SERPL-MCNC: 6.8 G/DL (ref 6.1–8.1)
RBC # BLD AUTO: 4.52 MILLION/UL (ref 3.8–5.1)
SL AMB EGFR AFRICAN AMERICAN: 130 ML/MIN/1.73M2
SL AMB EGFR NON AFRICAN AMERICAN: 112 ML/MIN/1.73M2
SODIUM SERPL-SCNC: 140 MMOL/L (ref 135–146)
TSH SERPL-ACNC: 6.72 MIU/L
WBC # BLD AUTO: 7 THOUSAND/UL (ref 3.8–10.8)

## 2019-10-22 ENCOUNTER — OFFICE VISIT (OUTPATIENT)
Dept: INTERNAL MEDICINE CLINIC | Facility: CLINIC | Age: 34
End: 2019-10-22
Payer: COMMERCIAL

## 2019-10-22 VITALS
BODY MASS INDEX: 36.62 KG/M2 | WEIGHT: 199 LBS | SYSTOLIC BLOOD PRESSURE: 120 MMHG | DIASTOLIC BLOOD PRESSURE: 80 MMHG | OXYGEN SATURATION: 99 % | HEIGHT: 62 IN | HEART RATE: 76 BPM

## 2019-10-22 DIAGNOSIS — D50.9 IRON DEFICIENCY ANEMIA, UNSPECIFIED IRON DEFICIENCY ANEMIA TYPE: ICD-10-CM

## 2019-10-22 DIAGNOSIS — F33.41 RECURRENT MAJOR DEPRESSIVE DISORDER, IN PARTIAL REMISSION (HCC): Primary | ICD-10-CM

## 2019-10-22 DIAGNOSIS — E03.9 ACQUIRED HYPOTHYROIDISM: ICD-10-CM

## 2019-10-22 PROBLEM — R42 VERTIGO: Status: RESOLVED | Noted: 2019-04-16 | Resolved: 2019-10-22

## 2019-10-22 PROBLEM — M54.2 CERVICALGIA: Status: RESOLVED | Noted: 2019-08-06 | Resolved: 2019-10-22

## 2019-10-22 PROCEDURE — 99214 OFFICE O/P EST MOD 30 MIN: CPT | Performed by: INTERNAL MEDICINE

## 2019-10-22 PROCEDURE — 3008F BODY MASS INDEX DOCD: CPT | Performed by: INTERNAL MEDICINE

## 2019-10-22 RX ORDER — LEVOTHYROXINE SODIUM 0.03 MG/1
25 TABLET ORAL
Qty: 90 TABLET | Refills: 3 | Status: SHIPPED | OUTPATIENT
Start: 2019-10-22 | End: 2019-12-10 | Stop reason: SDUPTHER

## 2019-10-22 NOTE — PROGRESS NOTES
INTERNAL MEDICINE FOLLOW-UP OFFICE VISIT  [facility] of BEHAVIORAL MEDICINE AT [facility]    NAME: Charles Pacheco  AGE: 36 y o  SEX: female  : 1985   MRN: [id number]    DATE: 10/22/2019  TIME: 12:48 PM    Assessment and Plan     Diagnoses and all orders for this visit:    Recurrent major depressive disorder, in partial remission ([facility] [address] )  -     sertraline (ZOLOFT) 50 mg tablet; Take 1 tablet (50 mg total) by mouth daily   patient has been taking Lexapro 20 mg daily and has taken the Wellbutrin also in the past but her depression symptoms are not getting better  I offered her to see the psychiatrist but she does not want to at this point  The Lexapro was discontinued and she was started on the Zoloft  I will see her back in 6 weeks for compliance issues  Acquired hypothyroidism  -     levothyroxine 25 mcg tablet; Take 1 tablet (25 mcg total) by mouth daily in the early morning  -     Comprehensive metabolic panel; Future  -     TSH, 3rd generation; Future  -     Lipid panel; Future  She was started on levothyroxine 25 mcg daily  I will recheck labs in 6 weeks  Iron deficiency anemia, unspecified iron deficiency anemia type  -     CBC and differential; Future  Will follow-up CBC  If hemoglobin is less, she will need to take iron pills      - Counseling Documentation: patient was counseled regarding: diagnostic results, instructions for management, risk factor reductions, prognosis, patient and family education, impressions, risks and benefits of treatment options and importance of compliance with treatment  - Medication Side Effects: Adverse side effects of medications were reviewed with the patient/guardian today  Return to office in:   Six weeks    Chief Complaint     Chief Complaint   Patient presents with    Well Check     6 month       History of Present Illness     HPI   depression -patient has been depressed most of her life    She was taking a lower dose of Lexapro which did not help and the dose was increased to the maximum at 20 mg daily but it is still not helping  In the past she also was taking a combination of Wellbutrin and Lexapro but that did not help as well  She has been very depressed and says that she is tired and fatigued at all times and does not want to do anything  Hypothyroidism - her thyroid was checked and the TSH is on the higher side at 6  I decided to start her on medication as she has a lot of symptoms of depression, fatigue and not being able to lose weight  Iron deficiency anemia - it seems to be stable  The following portions of the patient's history were reviewed and updated as appropriate: allergies, current medications, past family history, past medical history, past social history, past surgical history and problem list     Review of Systems     Review of Systems   Constitutional: Positive for fatigue  Negative for chills, diaphoresis and fever  HENT: Negative for congestion, ear discharge, ear pain, hearing loss, postnasal drip, rhinorrhea, sinus pressure, sinus pain, sneezing, sore throat and voice change  Eyes: Negative for pain, discharge, redness and visual disturbance  Respiratory: Negative for cough, chest tightness, shortness of breath and wheezing  Cardiovascular: Negative for chest pain, palpitations and leg swelling  Gastrointestinal: Negative for abdominal distention, abdominal pain, blood in stool, constipation, diarrhea, nausea and vomiting  Endocrine: Negative for cold intolerance, heat intolerance, polydipsia, polyphagia and polyuria  Genitourinary: Negative for dysuria, flank pain, frequency, hematuria and urgency  Musculoskeletal: Negative for arthralgias, back pain, gait problem, joint swelling, myalgias, neck pain and neck stiffness  Skin: Negative for rash  Neurological: Negative for dizziness, tremors, syncope, facial asymmetry, speech difficulty, weakness, light-headedness, numbness and headaches  Hematological: Does not bruise/bleed easily  Psychiatric/Behavioral: Positive for dysphoric mood  Negative for behavioral problems, confusion and sleep disturbance  The patient is not nervous/anxious  Active Problem List     Patient Active Problem List   Diagnosis    Anemia, iron deficiency    Recurrent major depressive disorder, in partial remission (HCC)    Seasonal allergies    Seborrheic dermatitis    Obesity (BMI 30-39  9)    Acquired hypothyroidism       Objective     /80   Pulse 76   Ht 5' 1 5" (1 562 m)   Wt 90 3 kg (199 lb)   SpO2 99%   BMI 36 99 kg/m²     Physical Exam   Constitutional: She is oriented to person, place, and time  She appears well-developed and well-nourished  No distress  HENT:   Head: Normocephalic and atraumatic  Right Ear: External ear normal    Left Ear: External ear normal    Nose: Nose normal    Mouth/Throat: Oropharynx is clear and moist    Eyes: Conjunctivae and EOM are normal  Right eye exhibits no discharge  Left eye exhibits no discharge  No scleral icterus  Neck: Normal range of motion  Neck supple  No JVD present  No tracheal deviation present  No thyromegaly present  Cardiovascular: Normal rate, regular rhythm, normal heart sounds and intact distal pulses  Exam reveals no gallop and no friction rub  No murmur heard  Pulmonary/Chest: Effort normal and breath sounds normal  No respiratory distress  She has no wheezes  She has no rales  She exhibits no tenderness  Abdominal: Soft  Bowel sounds are normal  She exhibits no distension  There is no tenderness  There is no rebound and no guarding  Musculoskeletal: Normal range of motion  She exhibits no edema or tenderness  Lymphadenopathy:     She has no cervical adenopathy  Neurological: She is alert and oriented to person, place, and time  No cranial nerve deficit  She exhibits normal muscle tone  Coordination normal    Skin: Skin is warm and dry  No rash noted  She is not diaphoretic  No erythema     Psychiatric: Judgment normal      She is moderately depressed           Current Medications       Current Outpatient Medications:     fexofenadine (ALLEGRA) 60 MG tablet, Take by mouth, Disp: , Rfl:     levothyroxine 25 mcg tablet, Take 1 tablet (25 mcg total) by mouth daily in the early morning, Disp: 90 tablet, Rfl: 3    sertraline (ZOLOFT) 50 mg tablet, Take 1 tablet (50 mg total) by mouth daily, Disp: 90 tablet, Rfl: 3    Health Maintenance     Health Maintenance   Topic Date Due    DTaP,Tdap,and Td Vaccines (1 - Tdap) 06/16/2007    INFLUENZA VACCINE  07/01/2019    BMI: Followup Plan  04/16/2020    BMI: Adult  10/22/2020    Cervical Cancer Screening  12/07/2020    Pneumococcal Vaccine: 65+ Years (1 of 2 - PCV13) 06/16/2051    Pneumococcal Vaccine: Pediatrics (0 to 5 Years) and At-Risk Patients (6 to 59 Years)  Aged Out    HEPATITIS B VACCINES  Aged [employer] History   Administered Date(s) Administered    MMR 01/15/2015         Inocencia Bhatti MD  [address] [place] [address]

## 2019-12-05 LAB
ALBUMIN SERPL-MCNC: 4.2 G/DL (ref 3.6–5.1)
ALBUMIN/GLOB SERPL: 1.4 (CALC) (ref 1–2.5)
ALP SERPL-CCNC: 84 U/L (ref 33–115)
ALT SERPL-CCNC: 20 U/L (ref 6–29)
AST SERPL-CCNC: 22 U/L (ref 10–30)
BASOPHILS # BLD AUTO: 62 CELLS/UL (ref 0–200)
BASOPHILS NFR BLD AUTO: 0.8 %
BILIRUB SERPL-MCNC: 0.4 MG/DL (ref 0.2–1.2)
BUN SERPL-MCNC: 17 MG/DL (ref 7–25)
BUN/CREAT SERPL: NORMAL (CALC) (ref 6–22)
CALCIUM SERPL-MCNC: 9.5 MG/DL (ref 8.6–10.2)
CHLORIDE SERPL-SCNC: 104 MMOL/L (ref 98–110)
CHOLEST SERPL-MCNC: 179 MG/DL
CHOLEST/HDLC SERPL: 3 (CALC)
CO2 SERPL-SCNC: 26 MMOL/L (ref 20–32)
CREAT SERPL-MCNC: 0.73 MG/DL (ref 0.5–1.1)
EOSINOPHIL # BLD AUTO: 312 CELLS/UL (ref 15–500)
EOSINOPHIL NFR BLD AUTO: 4 %
ERYTHROCYTE [DISTWIDTH] IN BLOOD BY AUTOMATED COUNT: 13 % (ref 11–15)
GLOBULIN SER CALC-MCNC: 3.1 G/DL (CALC) (ref 1.9–3.7)
GLUCOSE SERPL-MCNC: 87 MG/DL (ref 65–99)
HCT VFR BLD AUTO: 38 % (ref 35–45)
HDLC SERPL-MCNC: 59 MG/DL
HGB BLD-MCNC: 12.4 G/DL (ref 11.7–15.5)
LDLC SERPL CALC-MCNC: 99 MG/DL (CALC)
LYMPHOCYTES # BLD AUTO: 2028 CELLS/UL (ref 850–3900)
LYMPHOCYTES NFR BLD AUTO: 26 %
MCH RBC QN AUTO: 27.1 PG (ref 27–33)
MCHC RBC AUTO-ENTMCNC: 32.6 G/DL (ref 32–36)
MCV RBC AUTO: 83 FL (ref 80–100)
MONOCYTES # BLD AUTO: 507 CELLS/UL (ref 200–950)
MONOCYTES NFR BLD AUTO: 6.5 %
NEUTROPHILS # BLD AUTO: 4891 CELLS/UL (ref 1500–7800)
NEUTROPHILS NFR BLD AUTO: 62.7 %
NONHDLC SERPL-MCNC: 120 MG/DL (CALC)
PLATELET # BLD AUTO: 285 THOUSAND/UL (ref 140–400)
PMV BLD REES-ECKER: 11.5 FL (ref 7.5–12.5)
POTASSIUM SERPL-SCNC: 4.4 MMOL/L (ref 3.5–5.3)
PROT SERPL-MCNC: 7.3 G/DL (ref 6.1–8.1)
RBC # BLD AUTO: 4.58 MILLION/UL (ref 3.8–5.1)
SL AMB EGFR AFRICAN AMERICAN: 125 ML/MIN/1.73M2
SL AMB EGFR NON AFRICAN AMERICAN: 108 ML/MIN/1.73M2
SODIUM SERPL-SCNC: 138 MMOL/L (ref 135–146)
TRIGL SERPL-MCNC: 115 MG/DL
TSH SERPL-ACNC: 5.48 MIU/L
WBC # BLD AUTO: 7.8 THOUSAND/UL (ref 3.8–10.8)

## 2019-12-10 ENCOUNTER — OFFICE VISIT (OUTPATIENT)
Dept: INTERNAL MEDICINE CLINIC | Facility: CLINIC | Age: 34
End: 2019-12-10
Payer: COMMERCIAL

## 2019-12-10 VITALS
OXYGEN SATURATION: 98 % | BODY MASS INDEX: 37.32 KG/M2 | DIASTOLIC BLOOD PRESSURE: 80 MMHG | HEIGHT: 62 IN | SYSTOLIC BLOOD PRESSURE: 110 MMHG | WEIGHT: 202.8 LBS | HEART RATE: 76 BPM

## 2019-12-10 DIAGNOSIS — M54.12 CERVICAL RADICULOPATHY: ICD-10-CM

## 2019-12-10 DIAGNOSIS — J30.2 SEASONAL ALLERGIES: ICD-10-CM

## 2019-12-10 DIAGNOSIS — E03.9 ACQUIRED HYPOTHYROIDISM: Primary | ICD-10-CM

## 2019-12-10 DIAGNOSIS — G47.33 OBSTRUCTIVE SLEEP APNEA SYNDROME: ICD-10-CM

## 2019-12-10 DIAGNOSIS — F33.41 RECURRENT MAJOR DEPRESSIVE DISORDER, IN PARTIAL REMISSION (HCC): ICD-10-CM

## 2019-12-10 PROCEDURE — 3008F BODY MASS INDEX DOCD: CPT | Performed by: INTERNAL MEDICINE

## 2019-12-10 PROCEDURE — 99214 OFFICE O/P EST MOD 30 MIN: CPT | Performed by: INTERNAL MEDICINE

## 2019-12-10 RX ORDER — LEVOTHYROXINE SODIUM 0.05 MG/1
50 TABLET ORAL
Qty: 90 TABLET | Refills: 1 | Status: SHIPPED | OUTPATIENT
Start: 2019-12-10 | End: 2021-01-11 | Stop reason: SDUPTHER

## 2019-12-10 NOTE — PROGRESS NOTES
INTERNAL MEDICINE OFFICE VISIT  [facility] AT [facility]  [facility] [address], [place], [address] [place] [address]  Tel: [phone]      NAME: Cristopher Alejandra  AGE: 36 y o  SEX: female  : 1985   MRN: [id number]    DATE: 12/10/2019  TIME: 12:52 PM      Assessment and Plan:  1  Acquired hypothyroidism    The dose of the levothyroxine was increased to 50 mcg daily as the TSH is still on the higher side  Will check TSH in four months  - levothyroxine 50 mcg tablet; Take 1 tablet (50 mcg total) by mouth daily in the early morning  Dispense: 90 tablet; Refill: 1  - CBC and differential; Future  - Comprehensive metabolic panel; Future  - Lipid panel; Future  - TSH, 3rd generation; Future    2  Recurrent major depressive disorder, in partial remission ([facility] [address] )  Continue Zoloft at the same dose as she is doing very well  3  Seasonal allergies    Continue Allegra    4  Cervical radiculopathy   she was told to follow up with physical therapy as she is having numbness of her hand secondary to the neck pain  - Ambulatory referral to Physical Therapy; Future    5  Obstructive sleep apnea syndrome    Will get back to her with the results of the sleep study and treat accordingly       - Diagnostic Sleep Study; Future      - Counseling Documentation: patient was counseled regarding: diagnostic results, instructions for management, risk factor reductions, prognosis, patient and family education, impressions, risks and benefits of treatment options and importance of compliance with treatment  - Medication Side Effects: Adverse side effects of medications were reviewed with the patient/guardian today  Return for follow up visit in Four months or earlier, if needed        Chief Complaint:  Chief Complaint   Patient presents with    Follow-up     Started new medication         History of Present Illness:    hypothyroidism- she was started on 25 mcg of levothyroxine at the last visit but her TSH is still on the higher side   Depression-very well controlled with the Zoloft  Seasonal allergies -stable on the leg rise needed  Cervical radiculopathy - she is complaining of numbness of both her hands especially the right hand when she does extreme movements of the neck  She has problems with her neck for a few months  Sleep apnea -she says that her  complains that she has been snoring a lot for the last few months  She also has daytime fatigue      Active Problem List:  Patient Active Problem List   Diagnosis    Anemia, iron deficiency    Recurrent major depressive disorder, in partial remission (HCC)    Seasonal allergies    Seborrheic dermatitis    Obesity (BMI 30-39  9)    Acquired hypothyroidism    Cervical radiculopathy    Obstructive sleep apnea syndrome         Past Medical History:  Past Medical History:   Diagnosis Date    Cervicalgia 8/6/2019    Vertigo 4/16/2019         Past Surgical History:  Past Surgical History:   Procedure Laterality Date    NO PAST SURGERIES           Family History:  Family History   Problem Relation Age of Onset    No Known Problems Mother     No Known Problems Father          Social History:  Social History     Socioeconomic History    Marital status: /Civil Union     Spouse name: None    Number of children: None    Years of education: None    Highest education level: None   Occupational History    None   Social Needs    Financial resource strain: None    Food insecurity:     Worry: None     Inability: None    Transportation needs:     Medical: None     Non-medical: None   Tobacco Use    Smoking status: Never Smoker    Smokeless tobacco: Never Used   Substance and Sexual Activity    Alcohol use: No    Drug use: No    Sexual activity: Yes     Partners: Male   Lifestyle    Physical activity:     Days per week: 0 days     Minutes per session: 0 min    Stress: None   Relationships    Social connections:     Talks on phone: None     Gets together: None     Attends Congregation service: None     Active member of club or organization: None     Attends meetings of clubs or organizations: None     Relationship status: None    Intimate partner violence:     Fear of current or ex partner: None     Emotionally abused: None     Physically abused: None     Forced sexual activity: None   Other Topics Concern    None   Social History Narrative    None         Allergies: Allergies   Allergen Reactions    Sulfamethoxazole-Trimethoprim          Medications:    Current Outpatient Medications:     fexofenadine (ALLEGRA) 60 MG tablet, Take by mouth, Disp: , Rfl:     levothyroxine 50 mcg tablet, Take 1 tablet (50 mcg total) by mouth daily in the early morning, Disp: 90 tablet, Rfl: 1    sertraline (ZOLOFT) 50 mg tablet, Take 1 tablet (50 mg total) by mouth daily, Disp: 90 tablet, Rfl: 3      The following portions of the patient's history were reviewed and updated as appropriate: past medical history, past surgical history, family history, social history, allergies, current medications and active problem list       Review of Systems:  Constitutional: Denies fever, chills, weight gain, weight loss, fatigue  Eyes: Denies eye redness, eye discharge, double vision, change in visual acuity  ENT: Denies hearing loss, tinnitus, sneezing, nasal congestion, nasal discharge, sore throat   Respiratory: Denies cough, expectoration, hemoptysis, shortness of breath, wheezing  Cardiovascular: Denies chest pain, palpitations, lower extremity swelling, orthopnea, PND  Gastrointestinal: Denies abdominal pain, heartburn, nausea, vomiting, hematemesis, diarrhea, bloody stools  Genito-Urinary: Denies dysuria, frequency, difficulty in micturition, nocturia, incontinence  Musculoskeletal: Denies back pain, joint pain, muscle pain  Neurologic: Denies confusion, lightheadedness, syncope, headache, focal weakness, sensory changes, seizures   Numbness of hands  Endocrine: Denies polyuria, polydipsia, temperature intolerance  Allergy and Immunology: Denies hives, insect bite sensitivity  Hematological and Lymphatic: Denies bleeding problems, swollen glands   Psychological: Denies depression, suicidal ideation, anxiety, panic, mood swings  Dermatological: Denies pruritus, rash, skin lesion changes      Vitals:  Vitals:    12/10/19 1119   BP: 110/80   Pulse: 76   SpO2: 98%       Body mass index is 37 7 kg/m²  Weight (last 2 days)     Date/Time   Weight    12/10/19 1119   92 (202 8)                Physical Examination:  General: Patient is not in acute distress  Awake, alert, responding to commands  No weight gain or loss  Head: Normocephalic  Atraumatic  Eyes: Conjunctiva and lids with no swelling, erythema or discharge  Both pupils normal sized, round and reactive to light  Sclera nonicteric  ENT: External examination of nose and ear normal  Otoscopic examination shows translucent tympanic membranes with patent canals without erythema  Oropharynx moist with no erythema, edema, exudate or lesions  Neck: Supple  JVP not raised  Trachea midline  No masses  No thyromegaly  Lungs: No signs of increased work of breathing or respiratory distress  Bilateral bronchovascular breath sounds with no crackles or rhonchi  Chest wall: No tenderness  Cardiovascular: Normal PMI  No thrills  Regular rate and rhythm  S1 and S2 normal  No murmur, rub or gallop  Gastrointestinal: Abdomen soft, nontender  No guarding or rigidity  Liver and spleen not palpable  Bowel sounds present  Neurologic: Cranial nerves II-XII intact   Cortical functions normal  Motor system - Reflexes 2+ and symmetrical  Sensations normal  Musculoskeletal: Gait normal  No joint tenderness  Integumentary: Skin normal with no rash or lesions  Lymphatic: No palpable lymph nodes in neck, axilla or groin  Extremities: No clubbing, cyanosis, edema or varicosities  Psychological: Judgement and insight normal  Mood and affect normal      Laboratory Results:  CBC with diff:   Lab Results   Component Value Date    WBC 7 8 12/04/2019    WBC 6 5 10/11/2017    RBC 4 58 12/04/2019    RBC 4 48 10/11/2017    HGB 12 4 12/04/2019    HGB 11 9 10/11/2017    HCT 38 0 12/04/2019    HCT 36 9 10/11/2017    MCV 83 0 12/04/2019    MCV 82 4 10/11/2017    MCH 27 1 12/04/2019    MCH 26 6 (L) 10/11/2017    RDW 13 0 12/04/2019    RDW 14 8 10/11/2017     12/04/2019     10/11/2017       CMP:  Lab Results   Component Value Date    CREATININE 0 73 12/04/2019    CREATININE 0 79 10/11/2017    BUN 17 12/04/2019     10/11/2017    K 4 4 12/04/2019     12/04/2019    CO2 26 12/04/2019    PROT 6 8 10/11/2017    ALKPHOS 84 12/04/2019    ALT 20 12/04/2019    AST 22 12/04/2019       No results found for: HGBA1C, MG, PHOS    No results found for: TROPONINI, CKMB, CKTOTAL    Lipid Profile:   Lab Results   Component Value Date    CHOL 139 10/11/2017     Lab Results   Component Value Date    HDL 59 12/04/2019    HDL 68 10/05/2018     No results found for: [facility]  Lab Results   Component Value Date    TRIG 115 12/04/2019    TRIG 51 10/05/2018       Imaging Results:  No image results found         Health Maintenance:  Health Maintenance   Topic Date Due    DTaP,Tdap,and Td Vaccines (1 - Tdap) 06/16/1997    HIV Screening  06/16/2001    Influenza Vaccine  07/01/2019    BMI: Followup Plan  04/16/2020    BMI: Adult  10/22/2020    Cervical Cancer Screening  12/07/2020    Pneumococcal Vaccine: 65+ Years (1 of 2 - PCV13) 06/16/2051    Pneumococcal Vaccine: Pediatrics (0 to 5 Years) and At-Risk Patients (6 to 59 Years)  Aged Out    HIB Vaccine  Aged Out    Hepatitis B Vaccine  Aged Out    IPV Vaccine  Aged Out    Hepatitis A Vaccine  Aged Out    Meningococcal ACWY Vaccine  Aged Out    HPV Vaccine  Aged [employer] History   Administered Date(s) Administered    MMR 01/15/2015         Demetrius Solomon MD  12/10/2019,12:52 PM

## 2019-12-12 ENCOUNTER — TELEPHONE (OUTPATIENT)
Dept: INTERNAL MEDICINE CLINIC | Facility: CLINIC | Age: 34
End: 2019-12-12

## 2019-12-12 NOTE — TELEPHONE ENCOUNTER
Pt is brianna for 3/6/20 for a Diag Sleep Study, [facility]       The auth would be obtained from [facility]- since it's scheduled that far ahead- it'll be handled as a future pre-cert for:     If an auth would be obtained today; it would  before the test is brianna

## 2019-12-19 ENCOUNTER — EVALUATION (OUTPATIENT)
Dept: PHYSICAL THERAPY | Facility: CLINIC | Age: 34
End: 2019-12-19
Payer: COMMERCIAL

## 2019-12-19 DIAGNOSIS — M54.12 CERVICAL RADICULOPATHY: Primary | ICD-10-CM

## 2019-12-19 DIAGNOSIS — M54.2 CERVICAL PAIN: ICD-10-CM

## 2019-12-19 PROCEDURE — 97140 MANUAL THERAPY 1/> REGIONS: CPT | Performed by: PHYSICAL THERAPIST

## 2019-12-19 PROCEDURE — 97161 PT EVAL LOW COMPLEX 20 MIN: CPT | Performed by: PHYSICAL THERAPIST

## 2019-12-19 PROCEDURE — 97110 THERAPEUTIC EXERCISES: CPT | Performed by: PHYSICAL THERAPIST

## 2019-12-19 NOTE — PROGRESS NOTES
PT Evaluation     Today's date: 2019  Patient name: Lian Pabon  : 1985  MRN: [id number]  Referring provider: Alyson Christy MD  Dx:   Encounter Diagnosis     ICD-10-CM    1  Cervical radiculopathy M54 12 Ambulatory referral to Physical Therapy   2  Cervical pain M54 2        Start Time: 1205  Stop Time: 1245  Total time in clinic (min): 40 minutes    Assessment  Assessment details: Pt is a 37 y/o female presenting to physical therapy with chief complaint of pain superior to the R scapular with lifting OH and N/T in the pinky and ring fingers  Pt presents with WNL strength and ROM of the cervical spine and BUE  Pt had (+) ulnar nerve ULTT and decreased mobility at the CTJ  Mobilizing the CTJ improved symptoms during ULTT  Pt given nerve glides and CTJ mobility exercises for home  Pt would benefit from physical therapy in order to improve N/T and pain, but also to be able to return to PLOF and recreational activity without pain or limitations  Impairments: abnormal or restricted ROM, activity intolerance, impaired balance, impaired physical strength, lacks appropriate home exercise program and pain with function  Functional limitations: lifting OH, prolonged positions  Symptom irritability: moderateUnderstanding of Dx/Px/POC: good   Prognosis: good    Goals  STG: 3 weeks  1  Pt will demonstrate independence with HEP  2  Pt will report decrease in radicular sxs  3  Pt will report pain no higher than 5/10    LT weeks  1  Pt will improve cervical AROM to at least 80% to return to PLOF  2  Pt will report pain no more than 2/10  3  Pt will report no N/T in the hand  4   Pt will be able to [facility] lift at the gym      Plan  Patient would benefit from: skilled physical therapy  Planned modality interventions: cryotherapy and thermotherapy: hydrocollator packs  Planned therapy interventions: therapeutic exercise, therapeutic activities, stretching, strengthening, patient education, neuromuscular re-education, massage, manual therapy, balance, gait training and home exercise program  Frequency: 2x week  Duration in weeks: 6  Treatment plan discussed with: patient        Subjective Evaluation    History of Present Illness  Mechanism of injury: Pt reports about 1 year ago she was lifting free weight OH, and got a pain in her neck/shoulder  She reports she stopped lifting, and the pain went away  She notices turning to the R is painful  She reports she has noticed her hand "falls asleep", mainly in the pinky but sometimes the whole hand  She reports her elbow will lock when it is bent for too long  Recurrent probem    Quality of life: good    Pain  Current pain ratin  At best pain ratin  At worst pain ratin  Quality: sharp and needle-like  Relieving factors: ice and medications  Aggravating factors: overhead activity  Progression: no change    Patient Goals  Patient goals for therapy: decreased pain, return to sport/leisure activities, increased motion and increased strength          Objective     Neurological Testing     Sensation   Cervical/Thoracic   Left   Intact: light touch    Right   Intact: light touch    Reflexes     Right   Biceps (C5/C6): normal (2+)  Triceps (C7): normal (2+)    Joint Play     Hypomobile: C6 and C7     Comments: C6/7 mobs helped to decrease symptoms with ulnar nerve ULTT    Strength/Myotome Testing   Cervical Spine     Left   Normal strength    Right   Normal strength    Tests     Right Shoulder   Positive ULTT4       Additional Tests Details  Ulnar nerve glides brought on N/T in the pinky and ring fingers               Precautions: N/A      Manual              Ulnar NG 8'                                                                    Exercise Diary              UBE             Cervical ext with towel             Cervical ret             Ulnar NG - owl & cobra             Thoracic LLLD             CTJ ext over FR Modalities

## 2019-12-23 ENCOUNTER — TELEPHONE (OUTPATIENT)
Dept: SLEEP CENTER | Facility: CLINIC | Age: 34
End: 2019-12-23

## 2019-12-23 NOTE — TELEPHONE ENCOUNTER
----- Message from Raúl Elkins DO sent at 12/22/2019  8:28 PM EST -----  Chart reviewed  Study approved  Dr Almond Schaumann to read  ----- Message -----  From: Vinh Rojas MA  Sent: 12/16/2019   9:38 AM EST  To: Sleep Medicine [facility] Provider    This sleep study needs approval      If approved please sign and return to clerical pool  If denied please include reasons why  Also provide alternative testing if warranted  Please sign and return to clerical pool

## 2019-12-26 ENCOUNTER — OFFICE VISIT (OUTPATIENT)
Dept: PHYSICAL THERAPY | Facility: CLINIC | Age: 34
End: 2019-12-26
Payer: COMMERCIAL

## 2019-12-26 DIAGNOSIS — M54.2 CERVICAL PAIN: ICD-10-CM

## 2019-12-26 DIAGNOSIS — M54.12 CERVICAL RADICULOPATHY: Primary | ICD-10-CM

## 2019-12-26 PROCEDURE — 97112 NEUROMUSCULAR REEDUCATION: CPT

## 2019-12-26 PROCEDURE — 97110 THERAPEUTIC EXERCISES: CPT

## 2019-12-26 PROCEDURE — 97140 MANUAL THERAPY 1/> REGIONS: CPT

## 2019-12-26 NOTE — PROGRESS NOTES
Daily Note     Today's date: 2019  Patient name: Olivia Mills  : 1985  MRN: [id number]  Referring provider: Treva Story MD  Dx:   Encounter Diagnosis     ICD-10-CM    1  Cervical radiculopathy M54 12    2  Cervical pain M54 2                   Subjective: Pt reports she is feeling better from IE  Continues to have slight pain at base of c/s spine when performing ulnar nerve glides  Objective: See treatment diary below      Assessment: Pt reported improvement in R base of c/s spine pain following manual therapy and initiation of CTJ mobility exercises  N/t into R 5th digit during thoracic LLLD stretch  Will monitor response to treatment nv  Plan: Continue per plan of care        Precautions: N/A      Manual             Ulnar NG 8' 8'                                                                   Exercise Diary             UBE  3'f/3'b           Cervical ext with towel  x20           Cervical ret  x20           Ulnar NG - owl & cobra  x10 ea           Thoracic LLLD  3'           CTJ ext over FR  x20           LT ER at FR  rtb 3"x20           TB row/ext  gtb x20           scap wall slides                                                                                                                                                                Modalities

## 2019-12-27 ENCOUNTER — OFFICE VISIT (OUTPATIENT)
Dept: PHYSICAL THERAPY | Facility: CLINIC | Age: 34
End: 2019-12-27
Payer: COMMERCIAL

## 2019-12-27 DIAGNOSIS — M54.2 CERVICAL PAIN: ICD-10-CM

## 2019-12-27 DIAGNOSIS — M54.12 CERVICAL RADICULOPATHY: Primary | ICD-10-CM

## 2019-12-27 PROCEDURE — 97112 NEUROMUSCULAR REEDUCATION: CPT

## 2019-12-27 PROCEDURE — 97110 THERAPEUTIC EXERCISES: CPT

## 2019-12-27 NOTE — PROGRESS NOTES
Daily Note     Today's date: 2019  Patient name: Blanchard Siemens  : 1985  MRN: [id number]  Referring provider: Floresita Mason MD  Dx:   Encounter Diagnosis     ICD-10-CM    1  Cervical radiculopathy M54 12    2  Cervical pain M54 2                   Subjective: Pt reports she was able to put her hair in a braid with arm overhead without n/t into R hand  Objective: See treatment diary below      Assessment: Pt did report n/t into R hand during thoracic LLLD stretch at decreased intensity as usual   Demonstrates decreased postural endurance  Good awareness of proper exercise form throughout session  Will monitor response to treatment nv  Plan: Continue per plan of care        Precautions: N/A      Manual            Ulnar NG 8' 8' 4'                                                                  Exercise Diary            UBE  3'f/3'b 3'f/3'b          Cervical ext with towel  x20 x20          Cervical ret  x20 x20          Ulnar NG - owl & cobra  x10 ea x10          Thoracic LLLD  3' 3'          CTJ ext over FR  x20 x20          LT ER at FR  rtb 3"x20 rtb 3"x20          TB row/ext  gtb x20 gtb x20          scap wall slides   x20          C/s retraction in q-ped   x20          Thoracic ext    10"x10          Wall ball circles   Red x20 ea                                                                                                                      Modalities

## 2019-12-31 ENCOUNTER — OFFICE VISIT (OUTPATIENT)
Dept: PHYSICAL THERAPY | Facility: CLINIC | Age: 34
End: 2019-12-31
Payer: COMMERCIAL

## 2019-12-31 DIAGNOSIS — M54.2 CERVICAL PAIN: ICD-10-CM

## 2019-12-31 DIAGNOSIS — M54.12 CERVICAL RADICULOPATHY: Primary | ICD-10-CM

## 2019-12-31 PROCEDURE — 97110 THERAPEUTIC EXERCISES: CPT

## 2019-12-31 PROCEDURE — 97112 NEUROMUSCULAR REEDUCATION: CPT

## 2019-12-31 NOTE — PROGRESS NOTES
Daily Note     Today's date: 2019  Patient name: Zahira Cosby  : 1985  MRN: [id number]  Referring provider: Jena Winter MD  Dx:   Encounter Diagnosis     ICD-10-CM    1  Cervical radiculopathy M54 12    2  Cervical pain M54 2                   Subjective: Pt reports she has not experienced any n/t with overhead reaching over the last few days  Objective: See treatment diary below      Assessment: Able to complete all charted exercises without c/o n/t into hands  Pt did demonstrate periscapular fatigue post session this visit  Demonstrates good awareness of proper exercise form throughout session  Will monitor response to tx nv and consider overhead lifting exercises nv  Plan: Continue per plan of care        Precautions: N/A      Manual           Ulnar NG 8' 8' 4' 4'                                                                 Exercise Diary           UBE  3'f/3'b 3'f/3'b 3'f/3'b         Cervical ext with towel  x20 x20 x20         Cervical ret  x20 x20 x20         Ulnar NG - owl & cobra  x10 ea x10 x10 ea         Thoracic LLLD  3' 3' 3'         CTJ ext over FR  x20 x20 x20         LT ER at FR  rtb 3"x20 rtb 3"x20 gtb 3"x20         TB row/ext  gtb x20 gtb x20 gtb x20         scap wall slides   x20 x20         C/s retraction in q-ped   x20 x20         Thoracic ext    10"x10 10"x10         Wall ball circles   Red x20 ea Red x20 ea         Prone I T Y    x20         horiz abd at [facility] walks    rtb x10                                                                              Modalities

## 2020-01-03 ENCOUNTER — OFFICE VISIT (OUTPATIENT)
Dept: PHYSICAL THERAPY | Facility: CLINIC | Age: 35
End: 2020-01-03
Payer: COMMERCIAL

## 2020-01-03 DIAGNOSIS — M54.2 CERVICAL PAIN: ICD-10-CM

## 2020-01-03 DIAGNOSIS — M54.12 CERVICAL RADICULOPATHY: Primary | ICD-10-CM

## 2020-01-03 PROCEDURE — 97112 NEUROMUSCULAR REEDUCATION: CPT | Performed by: PHYSICAL THERAPIST

## 2020-01-03 PROCEDURE — 97140 MANUAL THERAPY 1/> REGIONS: CPT | Performed by: PHYSICAL THERAPIST

## 2020-01-03 PROCEDURE — 97110 THERAPEUTIC EXERCISES: CPT | Performed by: PHYSICAL THERAPIST

## 2020-01-03 NOTE — PROGRESS NOTES
Daily Note     Today's date: 1/3/2020  Patient name: Dyllan Watkins  : 1985  MRN: [id number]  Referring provider: Xiomy Goodrich MD  Dx:   Encounter Diagnosis     ICD-10-CM    1  Cervical radiculopathy M54 12    2  Cervical pain M54 2        Start Time: 1103  Stop Time: 1145  Total time in clinic (min): 42 minutes    Subjective: Pt reports she is having some stiffness in the neck, which she thinks may be from the new exercises  Objective: See treatment diary below      Assessment: Improvement in neck pain and stiffness following manual distraction  Pt able to complete all exercises without an increase in pain or N/T  Rest breaks required during LT ER due to muscular fatigue  Plan: Progress as tolerated       Precautions: N/A      Manual  12/19 12/26 12/27 12/31 1/3        Ulnar NG 8' 8' 4' 4' 5'        Distraction     5'                                                   Exercise Diary  12/19 12/26 12/27 12/31 1/3        UBE  3'f/3'b 3'f/3'b 3'f/3'b 3'/3'        Cervical ext with towel  x20 x20 x20 20x        Cervical ret  x20 x20 x20 +ext 20x        Ulnar NG - owl & cobra  x10 ea x10 x10 ea 10x ea        Thoracic LLLD  3' 3' 3' 3'        CTJ ext over FR  x20 x20 x20 20x        LT ER at FR  rtb 3"x20 rtb 3"x20 gtb 3"x20 GTB 3"x25        TB row/ext  gtb x20 gtb x20 gtb x20 BTB 20x        scap wall slides   x20 x20 20x        C/s retraction in q-ped   x20 x20 30x        Thoracic ext    10"x10 10"x10 10"x10        Wall ball circles   Red x20 ea Red x20 ea np        Prone I T Y    x20 np        horiz abd at  Home	[place]    gtb x20 GTB 20x        Wall monster walks    rtb x10 np        Supine ret + flex     20x                                                                Modalities

## 2020-01-06 ENCOUNTER — OFFICE VISIT (OUTPATIENT)
Dept: PHYSICAL THERAPY | Facility: CLINIC | Age: 35
End: 2020-01-06
Payer: COMMERCIAL

## 2020-01-06 DIAGNOSIS — M54.12 CERVICAL RADICULOPATHY: Primary | ICD-10-CM

## 2020-01-06 DIAGNOSIS — M54.2 CERVICAL PAIN: ICD-10-CM

## 2020-01-06 PROCEDURE — 97110 THERAPEUTIC EXERCISES: CPT | Performed by: PHYSICAL THERAPIST

## 2020-01-06 PROCEDURE — 97112 NEUROMUSCULAR REEDUCATION: CPT | Performed by: PHYSICAL THERAPIST

## 2020-01-06 NOTE — PROGRESS NOTES
Daily Note     Today's date: 2020  Patient name: Nakita Brady  : 1985  MRN: [id number]  Referring provider: Jaja Lara MD  Dx:   Encounter Diagnosis     ICD-10-CM    1  Cervical radiculopathy M54 12    2  Cervical pain M54 2        Start Time: 920  Stop Time: 05  Total time in clinic (min): 32 minutes    Subjective: Pt reports her neck is feeling better  Objective: See treatment diary below      Assessment: No manual tx performed today, to progress pt toward HEP  Pt able to complete all exercises without an increase in neck pain or N/T  Pt continues to have minimal N/T during thoracic LLLD, although decreased compared to previous visits  Plan: Progress as tolerated       Precautions: N/A      Manual  12/19 12/26 12/27 12/31 1/3 1/6       Ulnar NG 8' 8' 4' 4' 5' np       Distraction     5' np                                                  Exercise Diary  12/19 12/26 12/27 12/31 1/3 1/6       UBE  3'f/3'b 3'f/3'b 3'f/3'b 3'/3' 3'/3'       Cervical ext with towel  x20 x20 x20 20x 30x       Cervical ret  x20 x20 x20 +ext 20x +ext 30x       Ulnar NG - owl & cobra  x10 ea x10 x10 ea 10x ea        Thoracic LLLD  3' 3' 3' 3' 3'       CTJ ext over FR  x20 x20 x20 20x 20x       LT ER at FR  rtb 3"x20 rtb 3"x20 gtb 3"x20 GTB 3"x25 GTB 25x       TB row/ext  gtb x20 gtb x20 gtb x20 BTB 20x BTB 20x       scap wall slides   x20 x20 20x 20x       C/s retraction in q-ped   x20 x20 30x 30x       Thoracic ext    10"x10 10"x10 10"x10 10"x10       Wall ball circles   Red x20 ea Red x20 ea np        Prone I T Y    x20 np        horiz abd at [facility]    gtb x20 GTB 20x GTB 20x       Wall monster walks    rtb x10 np        Supine ret + flex     20x 20x                                                               Modalities

## 2020-01-10 ENCOUNTER — OFFICE VISIT (OUTPATIENT)
Dept: PHYSICAL THERAPY | Facility: CLINIC | Age: 35
End: 2020-01-10
Payer: COMMERCIAL

## 2020-01-10 DIAGNOSIS — M54.2 CERVICAL PAIN: ICD-10-CM

## 2020-01-10 DIAGNOSIS — M54.12 CERVICAL RADICULOPATHY: Primary | ICD-10-CM

## 2020-01-10 PROCEDURE — 97112 NEUROMUSCULAR REEDUCATION: CPT

## 2020-01-10 NOTE — PROGRESS NOTES
Daily Note     Today's date: 1/10/2020  Patient name: Dayana Leija  : 1985  MRN: [id number]  Referring provider: Saul Toribio MD  Dx:   Encounter Diagnosis     ICD-10-CM    1  Cervical radiculopathy M54 12    2  Cervical pain M54 2                   Subjective: Pt feels she does not get radicular sx with overhead activity now, but she does experience n/t with opening of chest and with UT activation during exercises  Objective: See treatment diary below      Assessment: Reviewed proper scapular setting during upper body exercises  Compensates with UT activation when performing scap retraction exercises  No complaints of pain or N/T reported throughout session  Consider d/c soon  Plan: Continue per plan of care        Precautions: N/A      Manual  12/19 12/26 12/27 12/31 1/3 1/6       Ulnar NG 8' 8' 4' 4' 5' np       Distraction     5' np                                                  Exercise Diary  12/19 12/26 12/27 12/31 1/3 1/6 1/10      UBE  3'f/3'b 3'f/3'b 3'f/3'b 3'/3' 3'/3' 3'f/3'b      Cervical ext with towel  x20 x20 x20 20x 30x x30      Cervical ret  x20 x20 x20 +ext 20x +ext 30x X20, +ext x10      Ulnar NG - owl & cobra  x10 ea x10 x10 ea 10x ea        Thoracic LLLD  3' 3' 3' 3' 3' 3'      CTJ ext over FR  x20 x20 x20 20x 20x x20      LT ER at FR  rtb 3"x20 rtb 3"x20 gtb 3"x20 GTB 3"x25 GTB 25x btb x20      TB row/ext  gtb x20 gtb x20 gtb x20 BTB 20x BTB 20x blk x20      scap wall slides   x20 x20 20x 20x rtb x20      C/s retraction in q-ped   x20 x20 30x 30x x30      Thoracic ext    10"x10 10"x10 10"x10 10"x10       Wall ball circles   Red x20 ea Red x20 ea np        Prone I T Y    x20 np  x20      horiz abd at [facility]    gtb x20 GTB 20x GTB 20x GTB x20      Wall monster walks    rtb x10 np        Supine ret + flex     20x 20x x20      scap depression + retraction       5"x20      rockerboard serratus plank       x20 ea      Supine wall angels       x20 Modalities

## 2020-01-13 ENCOUNTER — OFFICE VISIT (OUTPATIENT)
Dept: PHYSICAL THERAPY | Facility: CLINIC | Age: 35
End: 2020-01-13
Payer: COMMERCIAL

## 2020-01-13 DIAGNOSIS — M54.12 CERVICAL RADICULOPATHY: Primary | ICD-10-CM

## 2020-01-13 DIAGNOSIS — M54.2 CERVICAL PAIN: ICD-10-CM

## 2020-01-13 PROCEDURE — 97530 THERAPEUTIC ACTIVITIES: CPT

## 2020-01-13 PROCEDURE — 97112 NEUROMUSCULAR REEDUCATION: CPT

## 2020-01-13 PROCEDURE — 97110 THERAPEUTIC EXERCISES: CPT

## 2020-01-13 NOTE — PROGRESS NOTES
Daily Note     Today's date: 2020  Patient name: Lucy Forman  : 1985  MRN: [id number]  Referring provider: Chayito Hoffman MD  Dx:   Encounter Diagnosis     ICD-10-CM    1  Cervical radiculopathy M54 12    2  Cervical pain M54 2                   Subjective: Pt states she has not experienced n/t over the weekend  Pt reports she does not wake up in the middle of the night with tingling in UE any more  Objective: See treatment diary below      Assessment: Pt demonstrates decreased UE endurance, however was able to perform all overhead activity without complaints of pain, numbness, or tingling in RUE  Demonstrates good awareness of proper exercise form throughout session  Consider d/c nv pending response to today's treatment  Plan: Continue per plan of care        Precautions: N/A      Manual  12/19 12/26 12/27 12/31 1/3 1/6       Ulnar NG 8' 8' 4' 4' 5' np       Distraction     5' np                                                  Exercise Diary  12/19 12/26 12/27 12/31 1/3 1/6 1/10 1/13     UBE  3'f/3'b 3'f/3'b 3'f/3'b 3'/3' 3'/3' 3'f/3'b 3'f/3'b     Cervical ext with towel  x20 x20 x20 20x 30x x30      Cervical ret  x20 x20 x20 +ext 20x +ext 30x X20, +ext x10 HEP     Ulnar NG - owl & cobra  x10 ea x10 x10 ea 10x ea        Thoracic LLLD  3' 3' 3' 3' 3' 3' 3' FR     CTJ ext over FR  x20 x20 x20 20x 20x x20 x20     LT ER at FR  rtb 3"x20 rtb 3"x20 gtb 3"x20 GTB 3"x25 GTB 25x btb x20 BTB x20     TB row/ext  gtb x20 gtb x20 gtb x20 BTB 20x BTB 20x blk x20 blk x20     scap wall slides   x20 x20 20x 20x rtb x20 rtb x20     C/s retraction in q-ped   x20 x20 30x 30x x30 x30     Thoracic ext    10"x10 10"x10 10"x10 10"x10       Wall ball circles   Red x20 ea Red x20 ea np        Prone I T Y    x20 np  x20      horiz abd at [employer]	[place]    gtb x20 GTB 20x GTB 20x GTB x20 gtb x20     Wall monster walks    rtb x10 np        Supine ret + flex     20x 20x x20 x20     scap depression + retraction       5"x20 5"x20     rockerboard serratus plank       x20 ea x20 ea     Supine wall angels       x20 x20     Cone stacking        1' x3     Placing weight overhead        10# 2x10     OH carry        5# 3 laps ea         Modalities

## 2020-01-17 ENCOUNTER — OFFICE VISIT (OUTPATIENT)
Dept: PHYSICAL THERAPY | Facility: CLINIC | Age: 35
End: 2020-01-17
Payer: COMMERCIAL

## 2020-01-17 DIAGNOSIS — M54.2 CERVICAL PAIN: ICD-10-CM

## 2020-01-17 DIAGNOSIS — M54.12 CERVICAL RADICULOPATHY: Primary | ICD-10-CM

## 2020-01-17 PROCEDURE — 97110 THERAPEUTIC EXERCISES: CPT

## 2020-01-17 NOTE — PROGRESS NOTES
Daily Note     Today's date: 2020  Patient name: Zahira Cosby  : 1985  MRN: [id number]  Referring provider: Jena Winter MD  Dx:   Encounter Diagnosis     ICD-10-CM    1  Cervical radiculopathy M54 12    2  Cervical pain M54 2                   Subjective: Upon presentation patient offers no complaints  Denies radicular pains  Objective: See treatment diary below      Assessment: Patient demonstrates good tolerance to TE, able to complete full program without the onset of pain or numbness  Challenged with endurance activities overhead requiring rest time in between sets  Patient offers no complaints post session  Plan: Continue per plan of care        Precautions: N/A      Manual  12/19 12/26 12/27 12/31 1/3 1/6   1/17    Ulnar NG 8' 8' 4' 4' 5' np       Distraction     5' np                                                  Exercise Diary  12/19 12/26 12/27 12/31 1/3 1/6 1/10 1/13 1/17    UBE  3'f/3'b 3'f/3'b 3'f/3'b 3'/3' 3'/3' 3'f/3'b 3'f/3'b 3f'/3'b    Cervical ext with towel  x20 x20 x20 20x 30x x30      Cervical ret  x20 x20 x20 +ext 20x +ext 30x X20, +ext x10 HEP     Ulnar NG - owl & cobra  x10 ea x10 x10 ea 10x ea        Thoracic LLLD  3' 3' 3' 3' 3' 3' 3' FR 3' FR    CTJ ext over FR  x20 x20 x20 20x 20x x20 x20 x20    LT ER at FR  rtb 3"x20 rtb 3"x20 gtb 3"x20 GTB 3"x25 GTB 25x btb x20 BTB x20 BTB x20    TB row/ext  gtb x20 gtb x20 gtb x20 BTB 20x BTB 20x blk x20 blk x20 blk 20x    scap wall slides   x20 x20 20x 20x rtb x20 rtb x20 rtb x20    C/s retraction in q-ped   x20 x20 30x 30x x30 x30 x30    Thoracic ext    10"x10 10"x10 10"x10 10"x10       Wall ball circles   Red x20 ea Red x20 ea np        Prone I T Y    x20 np  x20      horiz abd at [employer]	[place]    gtb x20 GTB 20x GTB 20x GTB x20 gtb x20 gtb 20x    Wall monster walks    rtb x10 np        Supine ret + flex     20x 20x x20 x20 x20    scap depression + retraction       5"x20 5"x20 5"x20    rockerboard serratus plank       x20 ea x20 ea x20 ea    Supine wall angels       x20 x20 x20    Cone stacking        1' x3 1'x3    Placing weight overhead        10# 2x10 10# 2x10    OH carry        5# 3 laps ea 5# 3 laps        Modalities

## 2020-01-20 ENCOUNTER — OFFICE VISIT (OUTPATIENT)
Dept: PHYSICAL THERAPY | Facility: CLINIC | Age: 35
End: 2020-01-20
Payer: COMMERCIAL

## 2020-01-20 DIAGNOSIS — M54.2 CERVICAL PAIN: ICD-10-CM

## 2020-01-20 DIAGNOSIS — M54.12 CERVICAL RADICULOPATHY: Primary | ICD-10-CM

## 2020-01-20 PROCEDURE — 97112 NEUROMUSCULAR REEDUCATION: CPT

## 2020-01-20 PROCEDURE — 97110 THERAPEUTIC EXERCISES: CPT

## 2020-01-20 PROCEDURE — 97530 THERAPEUTIC ACTIVITIES: CPT

## 2020-01-20 NOTE — PROGRESS NOTES
Daily Note     Today's date: 2020  Patient name: Abdi Arana  : 1985  MRN: [id number]  Referring provider: Chato Delgado MD  Dx:   Encounter Diagnosis     ICD-10-CM    1  Cervical radiculopathy M54 12    2  Cervical pain M54 2                   Subjective: Pt reports she has not experienced any radicular sx or neck pain over the last week  Pt is agreeable to this visit being last visit  Objective: See treatment diary below      Assessment: Continues to be able to perform overhead activity without c/o pain or radicular sx  Demonstrates good awareness of proper exercise form  Pt does have difficulty with CKC scap stabilization exercises  HEP was provided this visit and pt was instructed to stop performing exercise if increased pain or discomfort occurs  Pt demonstrated verbal understanding  Plan: Continue per plan of care        Precautions: N/A      Manual  12/19 12/26 12/27 12/31 1/3 1/6   1/17    Ulnar NG 8' 8' 4' 4' 5' np       Distraction     5' np                                                  Exercise Diary  12/19 12/26 12/27 12/31 1/3 1/6 1/10 1/13 1/17 1/20   UBE  3'f/3'b 3'f/3'b 3'f/3'b 3'/3' 3'/3' 3'f/3'b 3'f/3'b 3f'/3'b 4'f/4'b   Cervical ext with towel  x20 x20 x20 20x 30x x30      Cervical ret  x20 x20 x20 +ext 20x +ext 30x X20, +ext x10 HEP     Ulnar NG - owl & cobra  x10 ea x10 x10 ea 10x ea        Thoracic LLLD  3' 3' 3' 3' 3' 3' 3' FR 3' FR 5' FR   CTJ ext over FR  x20 x20 x20 20x 20x x20 x20 x20    LT ER at FR  rtb 3"x20 rtb 3"x20 gtb 3"x20 GTB 3"x25 GTB 25x btb x20 BTB x20 BTB x20 BTB 3"x30   TB row/ext  gtb x20 gtb x20 gtb x20 BTB 20x BTB 20x blk x20 blk x20 blk 20x blk x20   scap wall slides   x20 x20 20x 20x rtb x20 rtb x20 rtb x20 rtb x20   C/s retraction in q-ped   x20 x20 30x 30x x30 x30 x30 x30   Thoracic ext    10"x10 10"x10 10"x10 10"x10       Wall ball circles   Red x20 ea Red x20 ea np        Prone I T Y    x20 np  x20      horiz abd at [facility] 20x GTB 20x GTB x20 gtb x20 gtb 20x btb x20   Wall monster walks    rtb x10 np        Supine ret + flex     20x 20x x20 x20 x20    scap depression + retraction       5"x20 5"x20 5"x20 5"x20   rockerboard serratus plank       x20 ea x20 ea x20 ea x20 cw/ccw   Supine wall angels       x20 x20 x20 x20   Cone stacking        1' x3 1'x3 2'   Placing weight overhead        10# 2x10 10# 2x10 10# 2x10   OH carry        5# 3 laps ea 5# 3 laps 5# 3 laps ea   Standing Y scap depression          x20       Modalities

## 2020-01-24 ENCOUNTER — APPOINTMENT (OUTPATIENT)
Dept: PHYSICAL THERAPY | Facility: CLINIC | Age: 35
End: 2020-01-24
Payer: COMMERCIAL

## 2020-01-27 ENCOUNTER — APPOINTMENT (OUTPATIENT)
Dept: PHYSICAL THERAPY | Facility: CLINIC | Age: 35
End: 2020-01-27
Payer: COMMERCIAL

## 2020-01-31 ENCOUNTER — APPOINTMENT (OUTPATIENT)
Dept: PHYSICAL THERAPY | Facility: CLINIC | Age: 35
End: 2020-01-31
Payer: COMMERCIAL

## 2020-02-11 NOTE — PROGRESS NOTES
PT Discharge    Today's date: 2020  Patient name: Arcenio Cohen  : 1985  MRN: [id number]  Referring provider: Elaine Lund MD  Dx:   Encounter Diagnosis     ICD-10-CM    1  Cervical radiculopathy M54 12    2  Cervical pain M54 2          Assessment  Assessment details: Pt and PT agreed to cancel remaining 3 appointments as pt returned to PLOF  Pt given written HEP and theraband to continue with at home and pt was agreeable  Subjective and objective information and goals unable to be updated at this time  Pt DC from skilled therapy  Impairments: abnormal or restricted ROM, activity intolerance, impaired balance, impaired physical strength, lacks appropriate home exercise program and pain with function  Functional limitations: lifting OH, prolonged positions  Symptom irritability: moderateUnderstanding of Dx/Px/POC: good   Prognosis: good    Goals  STG: 3 weeks - met  1  Pt will demonstrate independence with HEP  2  Pt will report decrease in radicular sxs  3  Pt will report pain no higher than 5/10    LT weeks - met  1  Pt will improve cervical AROM to at least 80% to return to PLOF  2  Pt will report pain no more than 2/10  3  Pt will report no N/T in the hand  4  Pt will be able to [facility] lift at the gym      Plan  Patient would benefit from: skilled physical therapy  Planned modality interventions: cryotherapy and thermotherapy: hydrocollator packs  Planned therapy interventions: therapeutic exercise, therapeutic activities, stretching, strengthening, patient education, neuromuscular re-education, massage, manual therapy, balance, gait training and home exercise program  Treatment plan discussed with: patient        Subjective Evaluation    History of Present Illness  Mechanism of injury: Pt reports about 1 year ago she was lifting free weight OH, and got a pain in her neck/shoulder  She reports she stopped lifting, and the pain went away  She notices turning to the R is painful   She reports she has noticed her hand "falls asleep", mainly in the pinky but sometimes the whole hand  She reports her elbow will lock when it is bent for too long  Recurrent probem    Quality of life: good    Pain  Current pain ratin  At best pain ratin  At worst pain ratin  Quality: sharp and needle-like  Relieving factors: ice and medications  Aggravating factors: overhead activity  Progression: no change    Patient Goals  Patient goals for therapy: decreased pain, return to sport/leisure activities, increased motion and increased strength          Objective     Neurological Testing     Sensation   Cervical/Thoracic   Left   Intact: light touch    Right   Intact: light touch    Reflexes     Right   Biceps (C5/C6): normal (2+)  Triceps (C7): normal (2+)    Joint Play     Hypomobile: C6 and C7     Comments: C6/7 mobs helped to decrease symptoms with ulnar nerve ULTT    Strength/Myotome Testing   Cervical Spine     Left   Normal strength    Right   Normal strength    Tests     Right Shoulder   Positive [id number]       Additional Tests Details  Ulnar nerve glides brought on N/T in the pinky and ring fingers        Flowsheet Rows      Most Recent Value   PT/OT G-Codes   Current Score  82   Projected Score  71

## 2020-03-06 ENCOUNTER — HOSPITAL ENCOUNTER (OUTPATIENT)
Dept: SLEEP CENTER | Facility: CLINIC | Age: 35
Discharge: HOME | End: 2020-03-06
Payer: COMMERCIAL

## 2020-03-06 DIAGNOSIS — G47.33 OBSTRUCTIVE SLEEP APNEA SYNDROME: ICD-10-CM

## 2020-03-06 PROCEDURE — 95810 POLYSOM 6/> YRS 4/> PARAM: CPT

## 2020-03-06 PROCEDURE — 95810 POLYSOM 6/> YRS 4/> PARAM: CPT | Performed by: INTERNAL MEDICINE

## 2020-03-07 NOTE — PROGRESS NOTES
Sleep Study Documentation    Pre-Sleep Study       Sleep testing procedure explained to patient:YES    Patient napped prior to study:YES- less than 30 minutes  Napped after 2PM: no    Caffeine:Dayshift worker after 12PM   Caffeine use:YES- soda  12 ounces    Alcohol:Dayshift workers after 5PM: Alcohol use:NO    Typical day for patient:YES       Study Documentation    Sleep Study Indications: snoring, BMI>30, EDS, unrefreshing sleep    Sleep Study: Diagnostic   Snore: Moderate  Supplemental O2: no    O2 flow rate (L/min) range n/a  O2 flow rate (L/min) final n/a  Minimum SaO2 89%  Baseline SaO2 100%        Mode of Therapy: n/a    EKG abnormalities: no     EEG abnormalities: no    Sleep Study Recorded < 2 hours: N/A    Sleep Study Recorded > 2 hours but incomplete study: N/A    Sleep Study Recorded 6 hours but no sleep obtained: NO    Patient classification: unemployed       Post-Sleep Study    Medication used at bedtime or during sleep study:YES other prescription medications    Patient reports time it took to fall asleep:30 to 60 minutes    Patient reports waking up during study:1 to 2 times  Patient reports returning to sleep without difficulty  Patient reports sleeping 4 to 6 hours without dreaming  Patient reports sleep during study:typical    Patient rated sleepiness: Very sleepy or tired    PAP treatment:no

## 2020-03-26 ENCOUNTER — TELEPHONE (OUTPATIENT)
Dept: SLEEP CENTER | Facility: CLINIC | Age: 35
End: 2020-03-26

## 2020-10-01 ENCOUNTER — TELEMEDICINE (OUTPATIENT)
Dept: INTERNAL MEDICINE CLINIC | Facility: CLINIC | Age: 35
End: 2020-10-01
Payer: COMMERCIAL

## 2020-10-01 DIAGNOSIS — G47.33 OBSTRUCTIVE SLEEP APNEA SYNDROME: Primary | ICD-10-CM

## 2020-10-01 DIAGNOSIS — E66.9 OBESITY (BMI 30-39.9): ICD-10-CM

## 2020-10-01 PROCEDURE — 99213 OFFICE O/P EST LOW 20 MIN: CPT | Performed by: INTERNAL MEDICINE

## 2020-10-02 DIAGNOSIS — F33.41 RECURRENT MAJOR DEPRESSIVE DISORDER, IN PARTIAL REMISSION (HCC): ICD-10-CM

## 2020-11-06 DIAGNOSIS — F33.41 RECURRENT MAJOR DEPRESSIVE DISORDER, IN PARTIAL REMISSION (HCC): ICD-10-CM

## 2021-01-11 DIAGNOSIS — E03.9 ACQUIRED HYPOTHYROIDISM: ICD-10-CM

## 2021-01-11 RX ORDER — LEVOTHYROXINE SODIUM 0.05 MG/1
50 TABLET ORAL
Qty: 90 TABLET | Refills: 0 | Status: SHIPPED | OUTPATIENT
Start: 2021-01-11 | End: 2021-03-01 | Stop reason: SDUPTHER

## 2021-03-01 DIAGNOSIS — E03.9 ACQUIRED HYPOTHYROIDISM: ICD-10-CM

## 2021-03-01 DIAGNOSIS — F33.41 RECURRENT MAJOR DEPRESSIVE DISORDER, IN PARTIAL REMISSION (HCC): ICD-10-CM

## 2021-03-01 RX ORDER — LEVOTHYROXINE SODIUM 0.05 MG/1
50 TABLET ORAL
Qty: 90 TABLET | Refills: 1 | Status: SHIPPED | OUTPATIENT
Start: 2021-03-01

## 2021-05-29 DIAGNOSIS — F33.41 RECURRENT MAJOR DEPRESSIVE DISORDER, IN PARTIAL REMISSION (HCC): ICD-10-CM

## 2021-06-01 DIAGNOSIS — F33.41 RECURRENT MAJOR DEPRESSIVE DISORDER, IN PARTIAL REMISSION (HCC): ICD-10-CM

## 2024-03-21 ENCOUNTER — APPOINTMENT (RX ONLY)
Dept: URBAN - METROPOLITAN AREA CLINIC 129 | Facility: CLINIC | Age: 39
Setting detail: DERMATOLOGY
End: 2024-03-21

## 2024-03-21 DIAGNOSIS — L21.8 OTHER SEBORRHEIC DERMATITIS: ICD-10-CM

## 2024-03-21 DIAGNOSIS — L85.3 XEROSIS CUTIS: ICD-10-CM

## 2024-03-21 DIAGNOSIS — Q826 OTHER SPECIFIED ANOMALIES OF SKIN: ICD-10-CM

## 2024-03-21 DIAGNOSIS — Q819 OTHER SPECIFIED ANOMALIES OF SKIN: ICD-10-CM

## 2024-03-21 DIAGNOSIS — L30.0 NUMMULAR DERMATITIS: ICD-10-CM

## 2024-03-21 DIAGNOSIS — Q828 OTHER SPECIFIED ANOMALIES OF SKIN: ICD-10-CM

## 2024-03-21 PROBLEM — L85.8 OTHER SPECIFIED EPIDERMAL THICKENING: Status: ACTIVE | Noted: 2024-03-21

## 2024-03-21 PROCEDURE — 99204 OFFICE O/P NEW MOD 45 MIN: CPT

## 2024-03-21 PROCEDURE — ? COUNSELING

## 2024-03-21 PROCEDURE — ? PRESCRIPTION

## 2024-03-21 RX ORDER — TRIAMCINOLONE ACETONIDE 1 MG/G
CREAM TOPICAL BID
Qty: 80 | Refills: 3 | Status: ERX | COMMUNITY
Start: 2024-03-21

## 2024-03-21 RX ORDER — CLOBETASOL PROPIONATE 0.5 MG/ML
SOLUTION TOPICAL
Qty: 50 | Refills: 5 | Status: ERX | COMMUNITY
Start: 2024-03-21

## 2024-03-21 RX ORDER — KETOCONAZOLE 20 MG/ML
SHAMPOO, SUSPENSION TOPICAL BIW
Qty: 120 | Refills: 10 | Status: ERX | COMMUNITY
Start: 2024-03-21

## 2024-03-21 RX ADMIN — TRIAMCINOLONE ACETONIDE: 1 CREAM TOPICAL at 00:00

## 2024-03-21 RX ADMIN — CLOBETASOL PROPIONATE: 0.5 SOLUTION TOPICAL at 00:00

## 2024-03-21 RX ADMIN — KETOCONAZOLE: 20 SHAMPOO, SUSPENSION TOPICAL at 00:00

## 2024-03-21 ASSESSMENT — LOCATION DETAILED DESCRIPTION DERM
LOCATION DETAILED: LEFT PROXIMAL POSTERIOR UPPER ARM
LOCATION DETAILED: RIGHT PROXIMAL POSTERIOR UPPER ARM
LOCATION DETAILED: LEFT DISTAL DORSAL FOREARM
LOCATION DETAILED: RIGHT SUPERIOR PARIETAL SCALP
LOCATION DETAILED: LEFT PROXIMAL DORSAL FOREARM
LOCATION DETAILED: RIGHT PROXIMAL DORSAL FOREARM
LOCATION DETAILED: LEFT INFERIOR MEDIAL UPPER BACK
LOCATION DETAILED: RIGHT DISTAL DORSAL FOREARM

## 2024-03-21 ASSESSMENT — LOCATION SIMPLE DESCRIPTION DERM
LOCATION SIMPLE: LEFT UPPER BACK
LOCATION SIMPLE: RIGHT FOREARM
LOCATION SIMPLE: LEFT FOREARM
LOCATION SIMPLE: LEFT POSTERIOR UPPER ARM
LOCATION SIMPLE: SCALP
LOCATION SIMPLE: RIGHT POSTERIOR UPPER ARM

## 2024-03-21 ASSESSMENT — LOCATION ZONE DERM
LOCATION ZONE: ARM
LOCATION ZONE: SCALP
LOCATION ZONE: TRUNK

## 2024-05-08 ENCOUNTER — APPOINTMENT (RX ONLY)
Dept: URBAN - METROPOLITAN AREA CLINIC 129 | Facility: CLINIC | Age: 39
Setting detail: DERMATOLOGY
End: 2024-05-08

## 2024-05-08 DIAGNOSIS — L21.8 OTHER SEBORRHEIC DERMATITIS: ICD-10-CM | Status: IMPROVED

## 2024-05-08 DIAGNOSIS — L85.3 XEROSIS CUTIS: ICD-10-CM

## 2024-05-08 DIAGNOSIS — L30.0 NUMMULAR DERMATITIS: ICD-10-CM | Status: IMPROVED

## 2024-05-08 DIAGNOSIS — Q819 OTHER SPECIFIED ANOMALIES OF SKIN: ICD-10-CM | Status: IMPROVED

## 2024-05-08 DIAGNOSIS — Q826 OTHER SPECIFIED ANOMALIES OF SKIN: ICD-10-CM | Status: IMPROVED

## 2024-05-08 DIAGNOSIS — Q828 OTHER SPECIFIED ANOMALIES OF SKIN: ICD-10-CM | Status: IMPROVED

## 2024-05-08 PROBLEM — L85.8 OTHER SPECIFIED EPIDERMAL THICKENING: Status: ACTIVE | Noted: 2024-05-08

## 2024-05-08 PROCEDURE — 99213 OFFICE O/P EST LOW 20 MIN: CPT

## 2024-05-08 PROCEDURE — ? TREATMENT REGIMEN

## 2024-05-08 PROCEDURE — ? COUNSELING

## 2024-05-08 ASSESSMENT — LOCATION DETAILED DESCRIPTION DERM
LOCATION DETAILED: LEFT INFERIOR MEDIAL UPPER BACK
LOCATION DETAILED: RIGHT SUPERIOR PARIETAL SCALP
LOCATION DETAILED: RIGHT PROXIMAL DORSAL FOREARM
LOCATION DETAILED: LEFT PROXIMAL POSTERIOR UPPER ARM
LOCATION DETAILED: RIGHT DISTAL DORSAL FOREARM
LOCATION DETAILED: LEFT PROXIMAL DORSAL FOREARM
LOCATION DETAILED: RIGHT PROXIMAL POSTERIOR UPPER ARM
LOCATION DETAILED: LEFT DISTAL DORSAL FOREARM

## 2024-05-08 ASSESSMENT — LOCATION ZONE DERM
LOCATION ZONE: TRUNK
LOCATION ZONE: ARM
LOCATION ZONE: SCALP

## 2024-05-08 ASSESSMENT — LOCATION SIMPLE DESCRIPTION DERM
LOCATION SIMPLE: LEFT FOREARM
LOCATION SIMPLE: SCALP
LOCATION SIMPLE: RIGHT FOREARM
LOCATION SIMPLE: LEFT UPPER BACK
LOCATION SIMPLE: RIGHT POSTERIOR UPPER ARM
LOCATION SIMPLE: LEFT POSTERIOR UPPER ARM